# Patient Record
Sex: MALE | Race: WHITE | Employment: OTHER | URBAN - METROPOLITAN AREA
[De-identification: names, ages, dates, MRNs, and addresses within clinical notes are randomized per-mention and may not be internally consistent; named-entity substitution may affect disease eponyms.]

---

## 2019-02-26 ENCOUNTER — OFFICE VISIT (OUTPATIENT)
Dept: SLEEP MEDICINE | Age: 59
End: 2019-02-26

## 2019-02-26 VITALS
DIASTOLIC BLOOD PRESSURE: 91 MMHG | HEIGHT: 70 IN | HEART RATE: 76 BPM | SYSTOLIC BLOOD PRESSURE: 151 MMHG | BODY MASS INDEX: 36.94 KG/M2 | OXYGEN SATURATION: 92 % | WEIGHT: 258 LBS

## 2019-02-26 DIAGNOSIS — G47.33 OSA (OBSTRUCTIVE SLEEP APNEA): Primary | ICD-10-CM

## 2019-02-26 PROBLEM — E66.01 SEVERE OBESITY (HCC): Status: ACTIVE | Noted: 2019-02-26

## 2019-02-26 RX ORDER — DOXYCYCLINE 100 MG/1
CAPSULE ORAL
COMMUNITY
Start: 2019-02-18 | End: 2019-04-24

## 2019-02-26 RX ORDER — PRAVASTATIN SODIUM 40 MG/1
TABLET ORAL
Refills: 0 | COMMUNITY
Start: 2018-12-07

## 2019-02-26 RX ORDER — LISINOPRIL 20 MG/1
TABLET ORAL
Refills: 0 | COMMUNITY
Start: 2019-01-08

## 2019-02-26 RX ORDER — BENZONATATE 100 MG/1
CAPSULE ORAL
COMMUNITY
Start: 2019-02-18

## 2019-02-26 NOTE — PROGRESS NOTES
[address] [place], [address]  Tel.  [phone]  Fax. 100 [facility] 60  [place], [address] [employer]  Tel.  [phone]  Fax. [phone] [address] [facility] [place]  Tel.  [phone]  Fax. [phone]       Chief Complaint       Chief Complaint   Patient presents with    Sleep Problem     NP, self refd, snoring, witnessed apnea       HPI      Ann Jeans is a  66 y.o. male seen for evaluation of a sleep disorder  . The patient reports he has experienced daytime sleepiness, snoring, witnessed apnea. The patient retires at 10 pm and awakens at 5-5: 30 am. The patient notes that he will not experience frequent awakening from sleep. In general he is able to return to sleep after awakening. he tends to awaken spontaneously. His wife is told him of snoring described as loud; often associated with apparent apneas. Snoring may be exacerbated if he has alcoholic beverages with dinner. He does not report frequent awakening. He may easily doze if he is seated and an active in the evening such as watching TV. He denies sleepwalking or sleep talking, nightmares or vivid dreaming, hypnagogic hallucinations or sleep paralysis, bruxism, nocturnal incontinence, abnormal arm or leg movements, cognitive difficulties, headache on awakening or cataplexy    The patient has not undergone diagnostic testing for the current problems. [place] Sleepiness Score: 17       Not on File    Current Outpatient Medications   Medication Sig Dispense Refill    benzonatate (TESSALON) 100 mg capsule       doxycycline (VIBRAMYCIN) 100 mg capsule       lisinopril (PRINIVIL, ZESTRIL) 20 mg tablet take 1 tablet by mouth once daily  0    pravastatin (PRAVACHOL) 40 mg tablet take 1 tablet by mouth once daily  0        He  has no past medical history on file. He  has no past surgical history on file. He family history is not on file.     He  reports that  has never smoked. he has never used smokeless tobacco. He reports that he drinks alcohol. He reports that he does not use drugs. Review of Systems:  Review of Systems   Constitutional: Negative for chills and fever. HENT: Negative for hearing loss and tinnitus. Eyes: Negative for blurred vision and double vision. Respiratory: Positive for shortness of breath. Negative for cough. Cardiovascular: Negative for chest pain and palpitations. Gastrointestinal: Negative for abdominal pain and heartburn. Genitourinary: Negative for frequency and urgency. Musculoskeletal: Negative for back pain and neck pain. Skin: Negative for itching and rash. Neurological: Negative for dizziness and headaches. Psychiatric/Behavioral: Negative for depression. Objective:     Visit Vitals  BP (!) 151/91 (BP 1 Location: Right arm, BP Patient Position: Sitting)   Pulse 76   Ht 5' 10\" (1.778 m)   Wt 258 lb (117 kg)   SpO2 92%   BMI 37.02 kg/m²     Body mass index is 37.02 kg/m². General:   Conversant, cooperative   Eyes:  Pupils equal and reactive, no nystagmus   Oropharynx:   Mallampati score IV,  tongue scalloped   Tonsils:     Neck:   No carotid bruits; Neck circ. in \"inches\": 21   Chest/Lungs:  Clear on auscultation    CVS:  Normal rate, regular rhythm, 1+ distal edema   Skin:  Warm to touch; no obvious rashes   Neuro:  Speech fluent, face symmetrical, tongue movement normal   Psych:  Normal affect,  normal countenance        Assessment:       ICD-10-CM ICD-9-CM    1. GADIEL (obstructive sleep apnea) G47.33 327.23      History of snoring, witnessed apnea and daytime fatigue consistent with sleep disordered breathing. He will be evaluated with a home sleep test.  The results will be reviewed with him. Plan:     No orders of the defined types were placed in this encounter. * Patient has a history and examination consistent with the diagnosis of sleep apnea.   *Home sleep testing was ordered for initial evaluation. * He was provided information on sleep apnea including corresponding risk factors and the importance of proper treatment. * Treatment options if indicated were reviewed today. Instructions:  o The patient would benefit from weight reduction measures. o Do not engage in activities requiring a normal degree of alertness if fatigue is present. o The patient understands that untreated or undertreated sleep apnea could impair judgement and the ability to function normally during the day.  o Call or return if symptoms worsen or persist.          Paco Araya MD, FAASM  Electronically signed 02/26/19       This note was created using voice recognition software. Despite editing, there may be syntax errors. This note will not be viewable in [address]

## 2019-02-26 NOTE — PATIENT INSTRUCTIONS

## 2019-02-26 NOTE — PROGRESS NOTES
[facility] Setup _ [address]      · Patient was educated on proper hookup and operation of the HSAT. · Instruction forms and documentation were reviewed and signed. · The patient demonstrated good understanding of the HSAT. · General information regarding operations and maintenance of the device was provided. · Patient was provided information on sleep apnea including coresponding risk factors and the importance of proper treatment. · Follow-up appointment was made to return the HSAT. He will be contacted once the results have been reviewed. · Patient was asked to contact our office for any problems regarding his home sleep test study.

## 2019-02-27 ENCOUNTER — DOCUMENTATION ONLY (OUTPATIENT)
Dept: SLEEP MEDICINE | Age: 59
End: 2019-02-27

## 2019-03-01 ENCOUNTER — DOCUMENTATION ONLY (OUTPATIENT)
Dept: SLEEP MEDICINE | Age: 59
End: 2019-03-01

## 2019-04-23 ENCOUNTER — HOSPITAL ENCOUNTER (EMERGENCY)
Age: 59
Discharge: HOME | End: 2019-04-24
Attending: EMERGENCY MEDICINE
Payer: COMMERCIAL

## 2019-04-23 ENCOUNTER — APPOINTMENT (OUTPATIENT)
Dept: ULTRASOUND IMAGING | Age: 59
End: 2019-04-23
Attending: EMERGENCY MEDICINE
Payer: COMMERCIAL

## 2019-04-23 DIAGNOSIS — I82.4Y1 DVT, LOWER EXTREMITY, PROXIMAL, ACUTE, RIGHT (HCC): Primary | ICD-10-CM

## 2019-04-23 LAB
ALBUMIN SERPL-MCNC: 3.5 G/DL (ref 3.5–5)
ALBUMIN/GLOB SERPL: 0.8 {RATIO} (ref 1.1–2.2)
ALP SERPL-CCNC: 84 U/L (ref 45–117)
ALT SERPL-CCNC: 18 U/L (ref 12–78)
ANION GAP SERPL CALC-SCNC: 6 MMOL/L (ref 5–15)
AST SERPL-CCNC: 12 U/L (ref 15–37)
BASOPHILS # BLD: 0 K/UL (ref 0–0.1)
BASOPHILS NFR BLD: 0 % (ref 0–1)
BILIRUB SERPL-MCNC: 0.7 MG/DL (ref 0.2–1)
BUN SERPL-MCNC: 9 MG/DL (ref 6–20)
BUN/CREAT SERPL: 10 (ref 12–20)
CALCIUM SERPL-MCNC: 8.8 MG/DL (ref 8.5–10.1)
CHLORIDE SERPL-SCNC: 102 MMOL/L (ref 97–108)
CO2 SERPL-SCNC: 26 MMOL/L (ref 21–32)
CREAT SERPL-MCNC: 0.88 MG/DL (ref 0.7–1.3)
DIFFERENTIAL METHOD BLD: ABNORMAL
EOSINOPHIL # BLD: 0.1 K/UL (ref 0–0.4)
EOSINOPHIL NFR BLD: 1 % (ref 0–7)
ERYTHROCYTE [DISTWIDTH] IN BLOOD BY AUTOMATED COUNT: 13.2 % (ref 11.5–14.5)
GLOBULIN SER CALC-MCNC: 4.2 G/DL (ref 2–4)
GLUCOSE SERPL-MCNC: 140 MG/DL (ref 65–100)
HCT VFR BLD AUTO: 40.8 % (ref 36.6–50.3)
HGB BLD-MCNC: 13.3 G/DL (ref 12.1–17)
IMM GRANULOCYTES # BLD AUTO: 0.1 K/UL (ref 0–0.04)
IMM GRANULOCYTES NFR BLD AUTO: 0 % (ref 0–0.5)
LYMPHOCYTES # BLD: 2.1 K/UL (ref 0.8–3.5)
LYMPHOCYTES NFR BLD: 17 % (ref 12–49)
MCH RBC QN AUTO: 31.4 PG (ref 26–34)
MCHC RBC AUTO-ENTMCNC: 32.6 G/DL (ref 30–36.5)
MCV RBC AUTO: 96.2 FL (ref 80–99)
MONOCYTES # BLD: 1.5 K/UL (ref 0–1)
MONOCYTES NFR BLD: 12 % (ref 5–13)
NEUTS SEG # BLD: 8.7 K/UL (ref 1.8–8)
NEUTS SEG NFR BLD: 70 % (ref 32–75)
NRBC # BLD: 0 K/UL (ref 0–0.01)
NRBC BLD-RTO: 0 PER 100 WBC
PLATELET # BLD AUTO: 183 K/UL (ref 150–400)
PMV BLD AUTO: 9.5 FL (ref 8.9–12.9)
POTASSIUM SERPL-SCNC: 3.9 MMOL/L (ref 3.5–5.1)
PROT SERPL-MCNC: 7.7 G/DL (ref 6.4–8.2)
RBC # BLD AUTO: 4.24 M/UL (ref 4.1–5.7)
SODIUM SERPL-SCNC: 134 MMOL/L (ref 136–145)
WBC # BLD AUTO: 12.4 K/UL (ref 4.1–11.1)

## 2019-04-23 PROCEDURE — 99283 EMERGENCY DEPT VISIT LOW MDM: CPT

## 2019-04-23 PROCEDURE — 36415 COLL VENOUS BLD VENIPUNCTURE: CPT

## 2019-04-23 PROCEDURE — 87040 BLOOD CULTURE FOR BACTERIA: CPT

## 2019-04-23 PROCEDURE — 93971 EXTREMITY STUDY: CPT

## 2019-04-23 PROCEDURE — 80053 COMPREHEN METABOLIC PANEL: CPT

## 2019-04-23 PROCEDURE — 85025 COMPLETE CBC W/AUTO DIFF WBC: CPT

## 2019-04-23 RX ORDER — SODIUM CHLORIDE 0.9 % (FLUSH) 0.9 %
5-40 SYRINGE (ML) INJECTION AS NEEDED
Status: DISCONTINUED | OUTPATIENT
Start: 2019-04-23 | End: 2019-04-24 | Stop reason: HOSPADM

## 2019-04-23 RX ORDER — SODIUM CHLORIDE 0.9 % (FLUSH) 0.9 %
5-40 SYRINGE (ML) INJECTION EVERY 8 HOURS
Status: DISCONTINUED | OUTPATIENT
Start: 2019-04-23 | End: 2019-04-24 | Stop reason: HOSPADM

## 2019-04-24 VITALS
WEIGHT: 253.97 LBS | DIASTOLIC BLOOD PRESSURE: 74 MMHG | HEART RATE: 89 BPM | SYSTOLIC BLOOD PRESSURE: 130 MMHG | BODY MASS INDEX: 36.36 KG/M2 | TEMPERATURE: 98.7 F | HEIGHT: 70 IN | RESPIRATION RATE: 18 BRPM | OXYGEN SATURATION: 97 %

## 2019-04-24 PROCEDURE — 74011250637 HC RX REV CODE- 250/637: Performed by: EMERGENCY MEDICINE

## 2019-04-24 RX ORDER — DOXYCYCLINE HYCLATE 100 MG
100 TABLET ORAL 2 TIMES DAILY
Qty: 14 TAB | Refills: 0 | Status: SHIPPED | OUTPATIENT
Start: 2019-04-24 | End: 2019-05-01

## 2019-04-24 RX ADMIN — APIXABAN 10 MG: 5 TABLET, FILM COATED ORAL at 00:02

## 2019-04-24 NOTE — ED NOTES
Dr. Tristen Emery reviewed discharge instructions with the patient and spouse. The patient and spouse verbalized understanding.

## 2019-04-24 NOTE — DISCHARGE INSTRUCTIONS
Patient Education        Deep Vein Thrombosis: Care Instructions  Your Care Instructions    A deep vein thrombosis (DVT) is a blood clot in certain veins of the legs, pelvis, or arms. Blood clots in these veins need to be treated because they can get bigger, break loose, and travel through the bloodstream to the lungs. A blood clot in a lung can be life-threatening. The doctor may have given you a blood thinner (anticoagulant). A blood thinner can stop the blood clot from growing larger and prevent new clots from forming. You will need to take a blood thinner for 3 to 6 months or longer. The doctor has checked you carefully, but problems can develop later. If you notice any problems or new symptoms, get medical treatment right away. Follow-up care is a key part of your treatment and safety. Be sure to make and go to all appointments, and call your doctor if you are having problems. It's also a good idea to know your test results and keep a list of the medicines you take. How can you care for yourself at home? · Take your medicines exactly as prescribed. Call your doctor if you think you are having a problem with your medicine. · If you are taking a blood thinner, be sure you get instructions about how to take your medicine safely. Blood thinners can cause serious bleeding problems. · Wear compression stockings if your doctor recommends them. These stockings are tighter at the feet than on the legs. They may reduce pain and swelling in your legs. But there are different types of stockings, and they need to fit right. So your doctor will recommend what you need. · When you sit, use a pillow to raise the arm or leg that has the blood clot. Try to keep it above the level of your heart. When should you call for help? Call 911 anytime you think you may need emergency care.  For example, call if:    · You passed out (lost consciousness).     · You have symptoms of a blood clot in your lung (called a pulmonary embolism). These include:  ? Sudden chest pain. ? Trouble breathing. ? Coughing up blood.    Call your doctor now or seek immediate medical care if:    · You have new or worse trouble breathing.     · You are dizzy or lightheaded, or you feel like you may faint.     · You have symptoms of a blood clot in your arm or leg. These may include:  ? Pain in the arm, calf, back of the knee, thigh, or groin. ? Redness and swelling in the arm, leg, or groin.    Watch closely for changes in your health, and be sure to contact your doctor if:    · You do not get better as expected. Where can you learn more? Go to http://thai-elisha.info/. Enter I433 in the search box to learn more about \"Deep Vein Thrombosis: Care Instructions. \"  Current as of: September 26, 2018  Content Version: 11.9  © 0282-2298 [employer]. Care instructions adapted under license by [employer] (which disclaims liability or warranty for this information). If you have questions about a medical condition or this instruction, always ask your healthcare professional. [place] [address] any warranty or liability for your use of this information.

## 2019-04-24 NOTE — ED PROVIDER NOTES
EMERGENCY DEPARTMENT HISTORY AND PHYSICAL EXAM 
 
 
Date: 4/23/2019 Patient Name: Kizzy Acuña History of Presenting Illness Chief Complaint Patient presents with  Fever  
  patient reports he twisted his knee over the weekend. had fluid drawn off right knee at [address] earlier today and has since had chills and fever of 100.6 at home  Knee Injury History Provided By: Patient HPI: Kizzy Acuña, 66 y.o. male with PMHx significant for previous right knee meniscus tear presents to the ED with right knee pain and fever. Patient states he was dancing at a wedding on Saturday night when he twisted his knee. Knee became very painful and swollen over the last 2 days. Pain was severe and sharp and located over the medial aspect of his knee. He presented to [facility] earlier this afternoon and had a large amount of fluid drained from his knee. His pain improved dramatically following the arthrocentesis. When he arrived back at home he was having chills and his wife took his temperature noting it to be 100.6. They called the [facility] on-call doctor who recommended them come to the ED for evaluation for possible septic joint. Patient has been able to ambulate on his knee and bend it especially after the arthrocentesis this afternoon he says it is feeling much better. He also reports in the past several days he has been less mobile than usual because of his knee pain he has been sitting in his recliner chair with his feet hanging down. He is noticed over the past 24 hours that his right leg has become more swollen in the calf area as well and his wife reports that the skin of the calf looks slightly pink compared to normal.   
There are no other complaints, changes, or physical findings at this time. PCP: Mike Rousseau MD 
 
No current facility-administered medications on file prior to encounter. Current Outpatient Medications on File Prior to Encounter Medication Sig Dispense Refill  benzonatate (TESSALON) 100 mg capsule  doxycycline (VIBRAMYCIN) 100 mg capsule  lisinopril (PRINIVIL, ZESTRIL) 20 mg tablet take 1 tablet by mouth once daily  0  
 pravastatin (PRAVACHOL) 40 mg tablet take 1 tablet by mouth once daily  0 Past History Past Medical History: No past medical history on file. Past Surgical History: No past surgical history on file. Family History: No family history on file. Social History: 
Social History Tobacco Use  Smoking status: Never Smoker  Smokeless tobacco: Never Used Substance Use Topics  Alcohol use: Yes Comment: oacass  Drug use: No  
 
 
Allergies: 
No Known Allergies Review of Systems Review of Systems Constitutional: Positive for chills and fever. HENT: Negative for congestion, ear pain and sore throat. Eyes: Negative. Respiratory: Negative for cough, chest tightness and shortness of breath. Cardiovascular: Positive for leg swelling. Negative for chest pain and palpitations. Gastrointestinal: Negative for abdominal pain, constipation, nausea and vomiting. Genitourinary: Negative for dysuria, flank pain and hematuria. Musculoskeletal: Positive for arthralgias (r knee), joint swelling and myalgias. Negative for neck pain. Skin: Positive for color change (redness right calf). Negative for rash. Wound: small abrasion anterior right lower leg. Neurological: Negative for dizziness, syncope, weakness, numbness and headaches. Psychiatric/Behavioral: Negative for confusion. The patient is not nervous/anxious. Physical Exam  
General appearance - well nourished, well appearing, and in no distress Eyes - pupils equal and reactive, extraocular eye movements intact ENT - mucous membranes moist, pharynx normal without lesions Neck - supple, no significant adenopathy; non-tender to palpation Chest - clear to auscultation, no wheezes, rales or rhonchi; non-tender to palpation Heart - normal rate and regular rhythm, S1 and S2 normal, no murmurs noted Abdomen - soft, nontender, nondistended, no masses or organomegaly Musculoskeletal -right knee with mild medial joint line tenderness, slightly decreased range of motion but able to bend to at least 90 degrees, no deformity, mild swelling of right knee deformityExtremities - peripheral pulses normal, right lower leg with 1+ pitting edema, and calf tenderness, Skin - supriya turgor, no rashes, slight erythema right lower leg Neurological - alert, oriented x3, normal speech, no focal findings or movement disorder noted Diagnostic Study Results Labs - No results found for this or any previous visit (from the past 12 hour(s)). Radiologic Studies - No orders to display CT Results  (Last 48 hours) None CXR Results  (Last 48 hours) None Medical Decision Making I am the first provider for this patient. I reviewed the vital signs, available nursing notes, past medical history, past surgical history, family history and social history. Vital Signs-Reviewed the patient's vital signs. Patient Vitals for the past 12 hrs: 
 Temp Pulse Resp BP SpO2  
04/23/19 2102 98.7 °F (37.1 °C) 93 16 144/73 96 % Records Reviewed: Nursing Notes and Old Medical Records Provider Notes (Medical Decision Making):  
43-year-old male presents with right knee pain status post an injury 3 days ago. Now with fever to 100.6 at home following arthrocentesis at [facility] on-call earlier today. Differential diagnosis includes cellulitis, DVT, septic joint, viral syndrome. Will check blood work, Doppler of right lower leg. Arthrocentesis fluid has been sent to lab by [facility] on-call so will not repeat procedure here. ED Course:  
Initial assessment performed.  The patients presenting problems have been discussed, and they are in agreement with the care plan formulated and outlined with them. I have encouraged them to ask questions as they arise throughout their visit. Progress Notes: 
 Right lower extremity Doppler positive for DVT that extends from the femoral vein down to the posterior tibial vein. Will prescribe Eliquis. Patient instructed to return immediately for any chest pains or shortness of breath. Disposition: 
NM home PLAN: 
1. Discharge Medication List as of 4/24/2019  1:10 AM  
  
START taking these medications Details  
apixaban (ELIQUIS) 5 mg (74 tabs) starter pack Take 10 mg (two 5 mg tablets) by mouth twice a day for 7 days Followed by 5 mg (one 5 mg tablet) by mouth twice a day, Print, Disp-1 Dose Pack, R-0  
  
  
CONTINUE these medications which have NOT CHANGED Details  
benzonatate (TESSALON) 100 mg capsule Historical Med  
  
lisinopril (PRINIVIL, ZESTRIL) 20 mg tablet take 1 tablet by mouth once daily, Historical Med, R-0  
  
pravastatin (PRAVACHOL) 40 mg tablet take 1 tablet by mouth once daily, Historical Med, R-0  
  
doxycycline (VIBRAMYCIN) 100 mg capsule Historical Med 2. Follow-up Information Follow up With Specialties Details Why Contact Info Lo Nunez MD [facility] Call today  [address] [place] [address] 
[phone] Ricardo Reyes MD Orthopedic Surgery Call today  [address] [place] [address] 
[phone] Return to ED if worse Diagnosis Clinical Impression: 1. DVT, lower extremity, proximal, acute, right ([address]

## 2019-04-28 LAB
BACTERIA SPEC CULT: NORMAL
SERVICE CMNT-IMP: NORMAL

## 2019-05-28 ENCOUNTER — OFFICE VISIT (OUTPATIENT)
Dept: SLEEP MEDICINE | Age: 59
End: 2019-05-28

## 2019-05-28 VITALS
HEIGHT: 70 IN | DIASTOLIC BLOOD PRESSURE: 76 MMHG | WEIGHT: 245 LBS | SYSTOLIC BLOOD PRESSURE: 145 MMHG | OXYGEN SATURATION: 98 % | RESPIRATION RATE: 19 BRPM | BODY MASS INDEX: 35.07 KG/M2 | HEART RATE: 86 BPM

## 2019-05-28 DIAGNOSIS — G47.33 OSA (OBSTRUCTIVE SLEEP APNEA): Primary | ICD-10-CM

## 2019-05-28 NOTE — PROGRESS NOTES
[address] [place], [address]  Tel.  [phone]  Fax. 100 [facility] 60  [place], [address] [facility]  Tel.  [phone]  Fax. [phone] [address] [facility] [place]   Tel.  [phone]  Fax. [phone]         Chief Complaint       Chief Complaint   Patient presents with    Sleep Problem     Adherence visit- bring machine         BUDDY        Snehal Thompson is a 66 y.o. male seen for follow-up. He reported daytime sleepiness, snoring, witnessed apnea. He was evaluated with a home sleep study which demonstrated severe sleep disordered breathing characterized by an AHI of 48.3/h associated with minimal SaO2 of 63%. Snoring during 69.3% of the study.       Events consisting of hypopnea and obstructive apnea. The pattern of the obstructive apnea consistent with REM-related events.      APAP 8-16 cm was started. Compliance data downloaded and reviewed in detail with the patient today. During the past 30 days, APAP used during 24 days with the average daily use of 5.8 hours. CMS compliance criteria 67%. AHI 2.1 per hour. From 3/19-4/17 APAP use during 30/30 days with the average daily use of 6.8 hours. CMS compliance criteria 100%. Average AHI 1.2/h. Peak average pressure 12.7 cm. Episodes of elevated leak. He notes he is more energetic and is not experiencing significant fatigue. No Known Allergies    Current Outpatient Medications   Medication Sig Dispense Refill    apixaban (ELIQUIS) 5 mg (74 tabs) starter pack Take 10 mg (two 5 mg tablets) by mouth twice a day for 7 days   Followed by 5 mg (one 5 mg tablet) by mouth twice a day 1 Dose Pack 0    benzonatate (TESSALON) 100 mg capsule       lisinopril (PRINIVIL, ZESTRIL) 20 mg tablet take 1 tablet by mouth once daily  0    pravastatin (PRAVACHOL) 40 mg tablet take 1 tablet by mouth once daily  0        He  has no past medical history on file.     He  has no past surgical history on file. He family history is not on file. He  reports that he has never smoked. He has never used smokeless tobacco. He reports that he drinks alcohol. He reports that he does not use drugs. Review of Systems:  Unchanged per patient      Objective:     Visit Vitals  /76 (BP 1 Location: Left arm, BP Patient Position: Sitting)   Pulse 86   Resp 19   Ht 5' 10\" (1.778 m)   Wt 245 lb (111.1 kg)   SpO2 98%   BMI 35.15 kg/m²     Body mass index is 35.15 kg/m². Assessment:       ICD-10-CM ICD-9-CM    1. GADIEL (obstructive sleep apnea) G47.33 327.23    Severe sleep disordered breathing responding well to APAP. He does have some episodes of mask leak. He would benefit from a mask refitting before he receives new supplies. He will contact the office as needed. he is compliant with PAP therapy and PAP continues to benefit patient and remains necessary for control of his sleep apnea. Plan:   No orders of the defined types were placed in this encounter. * Patient has a history and examination consistent with the diagnosis of sleep apnea. * He was provided information on sleep apnea including corresponding risk factors and the importance of proper treatment. * Treatment options if indicated were reviewed today. Potential benefit of weight reduction was discussed. Weight reduction techniques reviewed. Isael Valentine MD, [facility]  Electronically signed 05/28/19        This note was created using voice recognition software. Despite editing, there may be syntax errors. This note will not be viewable in [address]

## 2019-05-28 NOTE — PATIENT INSTRUCTIONS

## 2019-07-25 ENCOUNTER — HOSPITAL ENCOUNTER (OUTPATIENT)
Dept: ULTRASOUND IMAGING | Age: 59
Discharge: HOME | End: 2019-07-25
Attending: FAMILY MEDICINE
Payer: COMMERCIAL

## 2019-07-25 DIAGNOSIS — M79.661 PAIN OF RIGHT LOWER LEG: ICD-10-CM

## 2019-07-25 PROCEDURE — 93971 EXTREMITY STUDY: CPT

## 2022-03-19 PROBLEM — E66.01 SEVERE OBESITY (HCC): Status: ACTIVE | Noted: 2019-02-26

## 2024-01-30 PROBLEM — H04.123 DRY EYES, BILATERAL: Status: ACTIVE | Noted: 2024-01-30

## 2024-04-03 ENCOUNTER — TELEPHONE (OUTPATIENT)
Age: 64
End: 2024-04-03

## 2024-04-03 NOTE — TELEPHONE ENCOUNTER
Tried to call patient to obtain more info for his appt. Both numbers listed are non working numbers

## 2024-04-04 ENCOUNTER — OFFICE VISIT (OUTPATIENT)
Age: 64
End: 2024-04-04
Payer: COMMERCIAL

## 2024-04-04 ENCOUNTER — PREP FOR PROCEDURE (OUTPATIENT)
Age: 64
End: 2024-04-04

## 2024-04-04 VITALS
TEMPERATURE: 97.5 F | HEIGHT: 70 IN | BODY MASS INDEX: 31.35 KG/M2 | OXYGEN SATURATION: 97 % | DIASTOLIC BLOOD PRESSURE: 79 MMHG | SYSTOLIC BLOOD PRESSURE: 126 MMHG | HEART RATE: 71 BPM | WEIGHT: 219 LBS | RESPIRATION RATE: 20 BRPM

## 2024-04-04 DIAGNOSIS — K43.9 ABDOMINAL WALL HERNIA: ICD-10-CM

## 2024-04-04 DIAGNOSIS — E66.9 OBESITY (BMI 30.0-34.9): ICD-10-CM

## 2024-04-04 DIAGNOSIS — K43.9 ABDOMINAL WALL HERNIA: Primary | ICD-10-CM

## 2024-04-04 PROCEDURE — 99205 OFFICE O/P NEW HI 60 MIN: CPT | Performed by: SURGERY

## 2024-04-04 ASSESSMENT — PATIENT HEALTH QUESTIONNAIRE - PHQ9
SUM OF ALL RESPONSES TO PHQ QUESTIONS 1-9: 0
SUM OF ALL RESPONSES TO PHQ9 QUESTIONS 1 & 2: 0
SUM OF ALL RESPONSES TO PHQ QUESTIONS 1-9: 0
SUM OF ALL RESPONSES TO PHQ QUESTIONS 1-9: 0
1. LITTLE INTEREST OR PLEASURE IN DOING THINGS: NOT AT ALL
SUM OF ALL RESPONSES TO PHQ QUESTIONS 1-9: 0
2. FEELING DOWN, DEPRESSED OR HOPELESS: NOT AT ALL

## 2024-04-04 ASSESSMENT — ENCOUNTER SYMPTOMS
SHORTNESS OF BREATH: 0
BLOOD IN STOOL: 0

## 2024-04-04 NOTE — H&P (VIEW-ONLY)
Srinath Howe (:  1960) is a 63 y.o. male, here for evaluation of the following chief complaint(s):  New Patient (Seen at the request of Dr. Daquan Canales for evaluation of possible umbilical hernia)         ASSESSMENT/PLAN:  1. Abdominal wall hernia  2. Obesity (BMI 30.0-34.9)      I had an extensive discussion with him regarding the risks, benefits, and alternatives of proceeding with a(n) Laparoscopic anterior abdominal hernia Repair with Mesh, with separation of components, robot assisted.  Risks,benefits, and alternatives were discussed including the risk of anesthesia, bleeding, infection, injury to bowel, chronic pain, and recurrence were discussed.    We discussed his risk factors including: obese, advancement flaps. These related to perioperative morbidity including the increased risk of wound infection, and wound breakdown, and hernia reoccurrence  requiring intervention.      Due to the associated diastases he is high risk of recurrence if we do not reconstruct his abdominal wall at the time of hernia repair.    He expressed understanding of our discussion and is agreeable with plan.    Thank you for this consult.          Subjective   HPI:  HPI    Hernia   Getting bigger     Appetite ok BMs ok      ____________________________________________________________________________  Chief Complaint   Patient presents with   • New Patient     Seen at the request of Dr. Daquan Canales for evaluation of possible umbilical hernia     /79 (Site: Left Upper Arm, Position: Sitting, Cuff Size: Large Adult)   Pulse 71   Temp 97.5 °F (36.4 °C) (Temporal)   Resp 20   Ht 1.778 m (5' 10\")   Wt 99.3 kg (219 lb)   SpO2 97%   BMI 31.42 kg/m²   Past Medical History:   Diagnosis Date   • Hypercholesteremia    • Hypertension      Past Surgical History:   Procedure Laterality Date   • TONSILLECTOMY     • WISDOM TOOTH EXTRACTION       Social History     Socioeconomic History   • Marital status:

## 2024-04-04 NOTE — PROGRESS NOTES
Identified pt with two pt identifiers (name and ). Reviewed chart in preparation for visit and have obtained necessary documentation.    Srinath Howe is a 63 y.o. male  Chief Complaint   Patient presents with    New Patient     Seen at the request of Dr. Daquan Canales for evaluation of possible umbilical hernia     /79 (Site: Left Upper Arm, Position: Sitting, Cuff Size: Large Adult)   Pulse 71   Temp 97.5 °F (36.4 °C) (Temporal)   Resp 20   Ht 1.778 m (5' 10\")   Wt 99.3 kg (219 lb)   SpO2 97%   BMI 31.42 kg/m²     1. Have you been to the ER, urgent care clinic since your last visit?  Hospitalized since your last visit?no    2. Have you seen or consulted any other health care providers outside of the [facility] since your last visit?  Include any pap smears or colon screening. no   
     Trachea: No tracheal deviation.   Cardiovascular:      Rate and Rhythm: Normal rate and regular rhythm.      Heart sounds: Normal heart sounds. No murmur heard.  Pulmonary:      Effort: Pulmonary effort is normal. No respiratory distress.      Breath sounds: No wheezing.   Abdominal:      General: Bowel sounds are normal. There is no distension.      Palpations: Abdomen is soft. There is no mass.      Tenderness: There is abdominal tenderness (mild). There is no guarding or rebound.      Hernia: A hernia is present.       Musculoskeletal:         General: No tenderness. Normal range of motion.      Cervical back: Normal range of motion and neck supple.   Lymphadenopathy:      Cervical: No cervical adenopathy.   Skin:     General: Skin is warm.      Findings: No erythema or rash.   Neurological:      Mental Status: He is alert and oriented to person, place, and time.   Psychiatric:         Behavior: Behavior normal.        Name of Study: Rena MANDEL 20-01, Site 612  : 1960   Consent Provided By: Patient  Consent Version: Revision #5 Dated 2022, IRB Approved Dec 16, 2022  Date: 24  Time: 11 am      Consent was thoroughly reviewed with Srinath Howe on (date) 24, including study procedures, the approximate number of participants involved in the trial, length of participation, risks and benefits, other options for treatment, compensation for participation (if any), anticipated expenses, what happens in case of injury, cirumstances under which a subject's participation may be terminated, and participation is voluntary with the right to withdraw at any time without consequences.     We explained that records identifying the subject will be kept confidential and, to the extent permitted by the applicable laws and/or regulations, will not be made publicly available. If the results of the trial are published, the subject's identity will remain confidential. The monitor(s), the (s), the

## 2024-04-29 RX ORDER — DOXYCYCLINE HYCLATE 50 MG/1
50 CAPSULE ORAL AS NEEDED
COMMUNITY

## 2024-04-29 NOTE — PERIOP NOTE
[facility]  Preoperative Instructions        Surgery Date 5/1/24   Time of Arrival:  To Be Called  Contact # [phone]    1. On the day of your surgery, please report to the Surgical Services Registration Desk and sign in at your designated time. The Surgery Center is located to the right of the Emergency Room.     2. You must have someone with you to drive you home. You should not drive a car for 24 hours following surgery. Please make arrangements for a friend or family member to stay with you for the first 24 hours after your surgery.    3. Do not have anything to eat or drink (including water, gum, mints, coffee, juice) after midnight ?4/30/24?      .?This may not apply to medications prescribed by your physician. ?(Please note below the special instructions with medications to take the morning of your procedure.)    4. We recommend you do not drink any alcoholic beverages for 24 hours before and after your surgery.    5. Contact your surgeon’s office for instructions on the following medications: non-steroidal anti-inflammatory drugs (i.e. Advil, Aleve), vitamins, and supplements. (Some surgeon’s will want you to stop these medications prior to surgery and others may allow you to take them)  **If you are currently taking Plavix, Coumadin, Aspirin and/or other blood-thinning agents, contact your surgeon for instructions.** Your surgeon will partner with the physician prescribing these medications to determine if it is safe to stop or if you need to continue taking.  Please do not stop taking these medications without instructions from your surgeon    6. Wear comfortable clothes.  Wear glasses instead of contacts.  Do not bring any money or jewelry. Please bring picture ID, insurance card, and any prearranged co-payment or hospital payment.  Do not wear make-up, particularly mascara the morning of your surgery.  Do not wear nail polish, particularly if you are having foot /hand

## 2024-04-30 ENCOUNTER — ANESTHESIA EVENT (OUTPATIENT)
Facility: HOSPITAL | Age: 64
End: 2024-04-30
Payer: COMMERCIAL

## 2024-04-30 NOTE — ANESTHESIA PRE PROCEDURE
Cardiovascular:  Exercise tolerance: good (>4 METS)  (+) hypertension:, hyperlipidemia        Rhythm: regular  Rate: normal                    Neuro/Psych:   Negative Neuro/Psych ROS              GI/Hepatic/Renal: Neg GI/Hepatic/Renal ROS            Endo/Other:    (+) : arthritis: OA..                 Abdominal:             Vascular:           ROS comment: Hx of blood clots. Other Findings:         Anesthesia Plan      general     ASA 2       Induction: intravenous.    MIPS: Prophylactic antiemetics administered.  Anesthetic plan and risks discussed with patient.      Plan discussed with CRNA.                  Isaac Stratton MD   4/30/2024

## 2024-05-01 ENCOUNTER — HOSPITAL ENCOUNTER (OUTPATIENT)
Facility: HOSPITAL | Age: 64
Setting detail: OUTPATIENT SURGERY
Discharge: HOME | End: 2024-05-01
Attending: SURGERY | Admitting: SURGERY
Payer: COMMERCIAL

## 2024-05-01 ENCOUNTER — ANESTHESIA (OUTPATIENT)
Facility: HOSPITAL | Age: 64
End: 2024-05-01
Payer: COMMERCIAL

## 2024-05-01 VITALS
HEIGHT: 70 IN | BODY MASS INDEX: 30.55 KG/M2 | OXYGEN SATURATION: 96 % | DIASTOLIC BLOOD PRESSURE: 78 MMHG | HEART RATE: 68 BPM | WEIGHT: 213.41 LBS | SYSTOLIC BLOOD PRESSURE: 115 MMHG | RESPIRATION RATE: 14 BRPM | TEMPERATURE: 97.8 F

## 2024-05-01 DIAGNOSIS — R52 PAIN: Primary | ICD-10-CM

## 2024-05-01 PROCEDURE — 2709999900 HC NON-CHARGEABLE SUPPLY: Performed by: SURGERY

## 2024-05-01 PROCEDURE — 2580000003 HC RX 258

## 2024-05-01 PROCEDURE — 6370000000 HC RX 637 (ALT 250 FOR IP): Performed by: SURGERY

## 2024-05-01 PROCEDURE — 6360000002 HC RX W HCPCS: Performed by: SURGERY

## 2024-05-01 PROCEDURE — 7100000011 HC PHASE II RECOVERY - ADDTL 15 MIN: Performed by: SURGERY

## 2024-05-01 PROCEDURE — 2500000003 HC RX 250 WO HCPCS: Performed by: SURGERY

## 2024-05-01 PROCEDURE — C1781 MESH (IMPLANTABLE): HCPCS | Performed by: SURGERY

## 2024-05-01 PROCEDURE — 3600000019 HC SURGERY ROBOT ADDTL 15MIN: Performed by: SURGERY

## 2024-05-01 PROCEDURE — 2500000003 HC RX 250 WO HCPCS

## 2024-05-01 PROCEDURE — 88302 TISSUE EXAM BY PATHOLOGIST: CPT

## 2024-05-01 PROCEDURE — 2580000003 HC RX 258: Performed by: SURGERY

## 2024-05-01 PROCEDURE — 7100000010 HC PHASE II RECOVERY - FIRST 15 MIN: Performed by: SURGERY

## 2024-05-01 PROCEDURE — 6360000002 HC RX W HCPCS

## 2024-05-01 PROCEDURE — 49329 UNLSTD LAPS PX ABD PERTM&OMN: CPT | Performed by: SURGERY

## 2024-05-01 PROCEDURE — 3700000000 HC ANESTHESIA ATTENDED CARE: Performed by: SURGERY

## 2024-05-01 PROCEDURE — 3700000001 HC ADD 15 MINUTES (ANESTHESIA): Performed by: SURGERY

## 2024-05-01 PROCEDURE — S2900 ROBOTIC SURGICAL SYSTEM: HCPCS | Performed by: SURGERY

## 2024-05-01 PROCEDURE — 2580000003 HC RX 258: Performed by: ANESTHESIOLOGY

## 2024-05-01 PROCEDURE — 15734 MUSCLE-SKIN GRAFT TRUNK: CPT | Performed by: SURGERY

## 2024-05-01 PROCEDURE — 7100000001 HC PACU RECOVERY - ADDTL 15 MIN: Performed by: SURGERY

## 2024-05-01 PROCEDURE — 3600000009 HC SURGERY ROBOT BASE: Performed by: SURGERY

## 2024-05-01 PROCEDURE — 49596 RPR AA HRN 1ST > 10 NCR/STRN: CPT | Performed by: SURGERY

## 2024-05-01 PROCEDURE — 7100000000 HC PACU RECOVERY - FIRST 15 MIN: Performed by: SURGERY

## 2024-05-01 DEVICE — GORE SYNECOR PREPERITONEAL 20CMX25CMRECTANGLE BIOMATERIAL
Type: IMPLANTABLE DEVICE | Site: ABDOMEN | Status: FUNCTIONAL
Brand: GORE SYNECOR PREPERITONEAL BIOMATERIAL

## 2024-05-01 RX ORDER — SODIUM CHLORIDE, SODIUM LACTATE, POTASSIUM CHLORIDE, CALCIUM CHLORIDE 600; 310; 30; 20 MG/100ML; MG/100ML; MG/100ML; MG/100ML
INJECTION, SOLUTION INTRAVENOUS ONCE
Status: COMPLETED | OUTPATIENT
Start: 2024-05-01 | End: 2024-05-01

## 2024-05-01 RX ORDER — SODIUM CHLORIDE, SODIUM LACTATE, POTASSIUM CHLORIDE, CALCIUM CHLORIDE 600; 310; 30; 20 MG/100ML; MG/100ML; MG/100ML; MG/100ML
INJECTION, SOLUTION INTRAVENOUS CONTINUOUS
Status: DISCONTINUED | OUTPATIENT
Start: 2024-05-01 | End: 2024-05-01 | Stop reason: HOSPADM

## 2024-05-01 RX ORDER — DEXMEDETOMIDINE HYDROCHLORIDE 100 UG/ML
INJECTION, SOLUTION INTRAVENOUS PRN
Status: DISCONTINUED | OUTPATIENT
Start: 2024-05-01 | End: 2024-05-01 | Stop reason: SDUPTHER

## 2024-05-01 RX ORDER — SODIUM CHLORIDE 9 MG/ML
INJECTION, SOLUTION INTRAVENOUS PRN
Status: DISCONTINUED | OUTPATIENT
Start: 2024-05-01 | End: 2024-05-01 | Stop reason: HOSPADM

## 2024-05-01 RX ORDER — BETHANECHOL CHLORIDE 10 MG/1
10 TABLET ORAL
Status: DISCONTINUED | OUTPATIENT
Start: 2024-05-01 | End: 2024-05-01 | Stop reason: HOSPADM

## 2024-05-01 RX ORDER — SODIUM CHLORIDE, SODIUM LACTATE, POTASSIUM CHLORIDE, CALCIUM CHLORIDE 600; 310; 30; 20 MG/100ML; MG/100ML; MG/100ML; MG/100ML
INJECTION, SOLUTION INTRAVENOUS CONTINUOUS PRN
Status: DISCONTINUED | OUTPATIENT
Start: 2024-05-01 | End: 2024-05-01 | Stop reason: SDUPTHER

## 2024-05-01 RX ORDER — PROCHLORPERAZINE EDISYLATE 5 MG/ML
5 INJECTION INTRAMUSCULAR; INTRAVENOUS
Status: DISCONTINUED | OUTPATIENT
Start: 2024-05-01 | End: 2024-05-01 | Stop reason: HOSPADM

## 2024-05-01 RX ORDER — HYDROMORPHONE HYDROCHLORIDE 1 MG/ML
0.5 INJECTION, SOLUTION INTRAMUSCULAR; INTRAVENOUS; SUBCUTANEOUS EVERY 5 MIN PRN
Status: DISCONTINUED | OUTPATIENT
Start: 2024-05-01 | End: 2024-05-01 | Stop reason: HOSPADM

## 2024-05-01 RX ORDER — OXYCODONE HYDROCHLORIDE 5 MG/1
5 TABLET ORAL EVERY 6 HOURS PRN
Qty: 25 TABLET | Refills: 0 | Status: SHIPPED | OUTPATIENT
Start: 2024-05-01 | End: 2024-05-08

## 2024-05-01 RX ORDER — HYDROMORPHONE HYDROCHLORIDE 2 MG/ML
INJECTION, SOLUTION INTRAMUSCULAR; INTRAVENOUS; SUBCUTANEOUS PRN
Status: DISCONTINUED | OUTPATIENT
Start: 2024-05-01 | End: 2024-05-01 | Stop reason: SDUPTHER

## 2024-05-01 RX ORDER — SODIUM CHLORIDE 0.9 % (FLUSH) 0.9 %
5-40 SYRINGE (ML) INJECTION PRN
Status: DISCONTINUED | OUTPATIENT
Start: 2024-05-01 | End: 2024-05-01 | Stop reason: HOSPADM

## 2024-05-01 RX ORDER — ONDANSETRON 4 MG/1
4 TABLET, FILM COATED ORAL EVERY 8 HOURS PRN
Qty: 8 TABLET | Refills: 1 | Status: SHIPPED | OUTPATIENT
Start: 2024-05-01

## 2024-05-01 RX ORDER — POLYETHYLENE GLYCOL 3350 17 G/17G
17 POWDER, FOR SOLUTION ORAL DAILY
Qty: 116 G | Refills: 0 | COMMUNITY
Start: 2024-05-01 | End: 2024-05-08

## 2024-05-01 RX ORDER — OXYCODONE HYDROCHLORIDE 5 MG/1
10 TABLET ORAL ONCE
Status: COMPLETED | OUTPATIENT
Start: 2024-05-01 | End: 2024-05-01

## 2024-05-01 RX ORDER — ONDANSETRON 2 MG/ML
INJECTION INTRAMUSCULAR; INTRAVENOUS PRN
Status: DISCONTINUED | OUTPATIENT
Start: 2024-05-01 | End: 2024-05-01 | Stop reason: SDUPTHER

## 2024-05-01 RX ORDER — LIDOCAINE HYDROCHLORIDE 20 MG/ML
INJECTION, SOLUTION EPIDURAL; INFILTRATION; INTRACAUDAL; PERINEURAL PRN
Status: DISCONTINUED | OUTPATIENT
Start: 2024-05-01 | End: 2024-05-01 | Stop reason: SDUPTHER

## 2024-05-01 RX ORDER — SUCCINYLCHOLINE CHLORIDE 20 MG/ML
INJECTION INTRAMUSCULAR; INTRAVENOUS PRN
Status: DISCONTINUED | OUTPATIENT
Start: 2024-05-01 | End: 2024-05-01 | Stop reason: SDUPTHER

## 2024-05-01 RX ORDER — ROCURONIUM BROMIDE 10 MG/ML
INJECTION, SOLUTION INTRAVENOUS PRN
Status: DISCONTINUED | OUTPATIENT
Start: 2024-05-01 | End: 2024-05-01 | Stop reason: SDUPTHER

## 2024-05-01 RX ORDER — SODIUM CHLORIDE 0.9 % (FLUSH) 0.9 %
5-40 SYRINGE (ML) INJECTION EVERY 12 HOURS SCHEDULED
Status: DISCONTINUED | OUTPATIENT
Start: 2024-05-01 | End: 2024-05-01 | Stop reason: HOSPADM

## 2024-05-01 RX ORDER — KETOROLAC TROMETHAMINE 30 MG/ML
INJECTION, SOLUTION INTRAMUSCULAR; INTRAVENOUS PRN
Status: DISCONTINUED | OUTPATIENT
Start: 2024-05-01 | End: 2024-05-01 | Stop reason: SDUPTHER

## 2024-05-01 RX ORDER — ACETAMINOPHEN 325 MG/1
650 TABLET ORAL 4 TIMES DAILY PRN
COMMUNITY
Start: 2024-05-01

## 2024-05-01 RX ORDER — IBUPROFEN 600 MG/1
600 TABLET ORAL 3 TIMES DAILY PRN
Qty: 25 TABLET | Refills: 0 | Status: SHIPPED | OUTPATIENT
Start: 2024-05-01

## 2024-05-01 RX ORDER — NALOXONE HYDROCHLORIDE 0.4 MG/ML
INJECTION, SOLUTION INTRAMUSCULAR; INTRAVENOUS; SUBCUTANEOUS PRN
Status: DISCONTINUED | OUTPATIENT
Start: 2024-05-01 | End: 2024-05-01 | Stop reason: HOSPADM

## 2024-05-01 RX ORDER — DEXAMETHASONE SODIUM PHOSPHATE 4 MG/ML
INJECTION, SOLUTION INTRA-ARTICULAR; INTRALESIONAL; INTRAMUSCULAR; INTRAVENOUS; SOFT TISSUE PRN
Status: DISCONTINUED | OUTPATIENT
Start: 2024-05-01 | End: 2024-05-01 | Stop reason: SDUPTHER

## 2024-05-01 RX ORDER — OXYCODONE HYDROCHLORIDE 5 MG/1
5 TABLET ORAL
Status: DISCONTINUED | OUTPATIENT
Start: 2024-05-01 | End: 2024-05-01 | Stop reason: HOSPADM

## 2024-05-01 RX ORDER — ONDANSETRON 2 MG/ML
4 INJECTION INTRAMUSCULAR; INTRAVENOUS
Status: DISCONTINUED | OUTPATIENT
Start: 2024-05-01 | End: 2024-05-01 | Stop reason: HOSPADM

## 2024-05-01 RX ORDER — TAMSULOSIN HYDROCHLORIDE 0.4 MG/1
0.4 CAPSULE ORAL ONCE
Status: COMPLETED | OUTPATIENT
Start: 2024-05-01 | End: 2024-05-01

## 2024-05-01 RX ORDER — MIDAZOLAM HYDROCHLORIDE 1 MG/ML
INJECTION INTRAMUSCULAR; INTRAVENOUS PRN
Status: DISCONTINUED | OUTPATIENT
Start: 2024-05-01 | End: 2024-05-01 | Stop reason: SDUPTHER

## 2024-05-01 RX ORDER — FENTANYL CITRATE 50 UG/ML
INJECTION, SOLUTION INTRAMUSCULAR; INTRAVENOUS PRN
Status: DISCONTINUED | OUTPATIENT
Start: 2024-05-01 | End: 2024-05-01 | Stop reason: SDUPTHER

## 2024-05-01 RX ORDER — FENTANYL CITRATE 50 UG/ML
25 INJECTION, SOLUTION INTRAMUSCULAR; INTRAVENOUS EVERY 5 MIN PRN
Status: DISCONTINUED | OUTPATIENT
Start: 2024-05-01 | End: 2024-05-01 | Stop reason: HOSPADM

## 2024-05-01 RX ADMIN — ONDANSETRON 4 MG: 2 INJECTION INTRAMUSCULAR; INTRAVENOUS at 11:32

## 2024-05-01 RX ADMIN — SODIUM CHLORIDE, POTASSIUM CHLORIDE, SODIUM LACTATE AND CALCIUM CHLORIDE: 600; 310; 30; 20 INJECTION, SOLUTION INTRAVENOUS at 09:41

## 2024-05-01 RX ADMIN — SUCCINYLCHOLINE CHLORIDE 160 MG: 20 INJECTION, SOLUTION INTRAMUSCULAR; INTRAVENOUS at 09:44

## 2024-05-01 RX ADMIN — MIDAZOLAM HYDROCHLORIDE 2 MG: 1 INJECTION, SOLUTION INTRAMUSCULAR; INTRAVENOUS at 09:41

## 2024-05-01 RX ADMIN — HYDROMORPHONE HYDROCHLORIDE 0.3 MG: 2 INJECTION INTRAMUSCULAR; INTRAVENOUS; SUBCUTANEOUS at 10:36

## 2024-05-01 RX ADMIN — FENTANYL CITRATE 50 MCG: 50 INJECTION, SOLUTION INTRAMUSCULAR; INTRAVENOUS at 09:44

## 2024-05-01 RX ADMIN — ROCURONIUM BROMIDE 5 MG: 10 INJECTION INTRAVENOUS at 09:44

## 2024-05-01 RX ADMIN — HYDROMORPHONE HYDROCHLORIDE 0.5 MG: 2 INJECTION INTRAMUSCULAR; INTRAVENOUS; SUBCUTANEOUS at 10:08

## 2024-05-01 RX ADMIN — LIDOCAINE HYDROCHLORIDE 100 MG: 20 INJECTION, SOLUTION EPIDURAL; INFILTRATION; INTRACAUDAL; PERINEURAL at 09:44

## 2024-05-01 RX ADMIN — KETOROLAC TROMETHAMINE 15 MG: 30 INJECTION, SOLUTION INTRAMUSCULAR at 11:32

## 2024-05-01 RX ADMIN — DEXAMETHASONE SODIUM PHOSPHATE 4 MG: 4 INJECTION, SOLUTION INTRAMUSCULAR; INTRAVENOUS at 09:50

## 2024-05-01 RX ADMIN — WATER 2000 MG: 1 INJECTION INTRAMUSCULAR; INTRAVENOUS; SUBCUTANEOUS at 09:53

## 2024-05-01 RX ADMIN — PROPOFOL 200 MG: 10 INJECTION, EMULSION INTRAVENOUS at 09:44

## 2024-05-01 RX ADMIN — TAMSULOSIN HYDROCHLORIDE 0.4 MG: 0.4 CAPSULE ORAL at 12:15

## 2024-05-01 RX ADMIN — BETHANECHOL CHLORIDE 10 MG: 10 TABLET ORAL at 12:15

## 2024-05-01 RX ADMIN — HYDROMORPHONE HYDROCHLORIDE 0.4 MG: 2 INJECTION INTRAMUSCULAR; INTRAVENOUS; SUBCUTANEOUS at 11:58

## 2024-05-01 RX ADMIN — OXYCODONE 10 MG: 5 TABLET ORAL at 12:15

## 2024-05-01 RX ADMIN — SODIUM CHLORIDE, POTASSIUM CHLORIDE, SODIUM LACTATE AND CALCIUM CHLORIDE: 600; 310; 30; 20 INJECTION, SOLUTION INTRAVENOUS at 08:33

## 2024-05-01 RX ADMIN — ROCURONIUM BROMIDE 20 MG: 10 INJECTION INTRAVENOUS at 10:24

## 2024-05-01 RX ADMIN — ROCURONIUM BROMIDE 25 MG: 10 INJECTION INTRAVENOUS at 09:54

## 2024-05-01 RX ADMIN — DEXMEDETOMIDINE HYDROCHLORIDE 4 MCG: 100 INJECTION, SOLUTION, CONCENTRATE INTRAVENOUS at 10:23

## 2024-05-01 RX ADMIN — SUGAMMADEX 200 MG: 100 INJECTION, SOLUTION INTRAVENOUS at 11:34

## 2024-05-01 RX ADMIN — FENTANYL CITRATE 50 MCG: 50 INJECTION, SOLUTION INTRAMUSCULAR; INTRAVENOUS at 10:00

## 2024-05-01 ASSESSMENT — PAIN DESCRIPTION - DESCRIPTORS
DESCRIPTORS: SORE
DESCRIPTORS: ACHING

## 2024-05-01 ASSESSMENT — PAIN DESCRIPTION - ORIENTATION: ORIENTATION: ANTERIOR

## 2024-05-01 ASSESSMENT — PAIN SCALES - GENERAL
PAINLEVEL_OUTOF10: 3
PAINLEVEL_OUTOF10: 5

## 2024-05-01 ASSESSMENT — PAIN DESCRIPTION - LOCATION
LOCATION: ABDOMEN
LOCATION: ABDOMEN

## 2024-05-01 ASSESSMENT — PAIN - FUNCTIONAL ASSESSMENT: PAIN_FUNCTIONAL_ASSESSMENT: 0-10

## 2024-05-01 ASSESSMENT — PAIN DESCRIPTION - PAIN TYPE: TYPE: SURGICAL PAIN

## 2024-05-01 NOTE — DISCHARGE INSTRUCTIONS
Dr. Pinzon's Discharge Instructions after  Ventral/Insicional Hernia Repair  for:  Srinath Howe    MRN: [id number] : 1960    Admitted: 2024  Discharged: 2024         Hernia Repair: What to Expect at Home     Your Recovery    You are likely to have pain for the next few days. You may also feel like you have the flu, and you may have a low fever and feel tired and nauseated. This is common.    You should feel better after a few days and will probably feel much better in 7 days. For several weeks you may feel twinges or pulling in the hernia repair when you move. You may have some bruising and swelling. This is normal.    This care sheet gives you a general idea about how long it will take for you to recover. But each person recovers at a different pace. Follow the steps below to get better as quickly as possible.    How can you care for yourself at home?    Activity  Rest when you feel tired. Getting enough sleep will help you recover. You can sleep with your head up by using three or four pillows. You can also try to sleep with your head up in a recliner chair.  Try to walk each day. Start by walking a little more than you did the day before. Bit by bit, increase the amount you walk. Walking boosts blood flow and helps prevent pneumonia and constipation.  Put ice or a cold pack on the area of your hernia repair for 10 to 15 minutes at a time. Try to do this every 1 to 2 hours for the first 24 hours (when you are awake) or until the swelling goes down. Put a thin cloth between the ice and your skin.  Avoid strenuous activities, such as biking, jogging, weight lifting, or aerobic exercise, until your doctor says it is okay.  You are encouraged to cough and deep breath or use your incentive spirometer if you were given one, every 15-30 minutes when awake.  This will help prevent respiratory complications and low grade fevers post-operatively.  You may want to hug a pillow when coughing and sneezing

## 2024-05-01 NOTE — OP NOTE
OPERATIVE NOTE  5/1/2024    Preperative Diagnosis: Incarcerated abdominal wall hernia   Post-operative Diagnosis: Incarcerated abdominal wall hernia    Surgeon: Cody Pinzon MD FACS  Assistant: Circulator: Magaly Monzon RN  First Assistant: Liddle, Gerald, RN  Relief Scrub: Bertha Moreno RN  Scrub Person First: Max Gould    Procedures:    1. Right retrorectus release (myocutaneous flap of the trunk)  2. Left retrorectus release (myocutaneous flap of the trunk)  3. Laparoscopic incarcerated abdominal wall hernia repair with placement of mesh,  14  cm  4. Robot assisted.    Anesthesia: Gen + Local  Estimated Blood Loss: Minimal    Complications: None  Pathology:   ID Type Source Tests Collected by Time Destination   1 : abdominal wall hernia sac Tissue Hernia Sac SURGICAL PATHOLOGY Cody Pinzon MD 5/1/2024 1104      Implants:   Implant Name Type Inv. Item Serial No.  Lot No. LRB No. Used Action   MESH ELIZA [id number] RECT [employer] POLYPR - [id number]  MESH ELIZA [id number] RECT [employer]OMAT[employer] COMP POLYPR [id number]  [employer] NA N/A 1 Implanted       FINDINGS: The patient is noted to have a 5 CM defect along the midline at the umbilicus with a second 2 cm hernia just cephalad and to the right.  These were noted to contain incarcerated omentum.  Some of the omentum was amputated along with a hernia sac and sent to pathology.  There were additional 1-2 cm hernias along the upper midline.  These hernias were all associated with an 8 cm diastases.  Total craniocaudal length of the hernias is 14 cm. The fascia was able to be approximated only after recreation of the anterior abdominal wall with a myocutaneous advancement flap of the right and left rectus muscle. Remaining abdominal cavity appeared normal.     Hernia size in cm: Craniocaudal 14 x Width 5  Hernia incisional? No  Location: Location of Hernia: M3 (Midline- Umbilical)  Mesh was used for Mesh:

## 2024-05-01 NOTE — PERIOP NOTE
0809 - PT DENIES FEVER, COLD, COUGH, SOB, N/V, DIARRHEA... PRE-OP TCHING DONE - PT VERBALIZES UNDERSTANDING.  STRETCHER IN LOWEST POSITION, CB IN PLACE AND SR UP X2.

## 2024-05-01 NOTE — INTERVAL H&P NOTE
Update History & Physical    The Patient's History and Physical attached was reviewed with the patient and I examined the patient.  There is no change.  The surgical site was confirmed by the patient and me.    Plan:  The risk, benefits, expected outcome, and alternative to the recommended procedure have been discussed with Srinath Howe.  He understands and wants to proceed with the procedure.

## 2024-05-01 NOTE — ANESTHESIA POSTPROCEDURE EVALUATION
Department of Anesthesiology  Postprocedure Note    Patient: Srinath Howe  MRN: [id number]  YOB: 1960  Date of evaluation: 5/1/2024    Procedure Summary       Date: 05/01/24 Room / Location: [facility] MAIN OR  / [facility] MAIN OR    Anesthesia Start: 0940 Anesthesia Stop: 1203    Procedure: ROBOT ASSISTED LAPAROSCOPIC ANTERIOR ABDOMINAL HERNIA REPAIR WITH MESH WITH SEPERATION OF COMPONENTS (Abdomen) Diagnosis:       Abdominal wall hernia      (Abdominal wall hernia [K43.9])    Providers: Cody Pinzon MD Responsible Provider: Isaac Stratton MD    Anesthesia Type: General ASA Status: 2            Anesthesia Type: General    Javier Phase I: Javier Score: 10    Javier Phase II: Javier Score: 10    Anesthesia Post Evaluation    Patient location during evaluation: PACU  Patient participation: complete - patient participated  Level of consciousness: awake and alert  Airway patency: patent  Nausea & Vomiting: no nausea  Cardiovascular status: hemodynamically stable  Respiratory status: acceptable  Hydration status: euvolemic  Pain management: adequate    No notable events documented.

## 2024-05-16 ENCOUNTER — OFFICE VISIT (OUTPATIENT)
Age: 64
End: 2024-05-16

## 2024-05-16 VITALS
HEIGHT: 70 IN | BODY MASS INDEX: 31.24 KG/M2 | TEMPERATURE: 98 F | SYSTOLIC BLOOD PRESSURE: 118 MMHG | OXYGEN SATURATION: 94 % | HEART RATE: 64 BPM | RESPIRATION RATE: 16 BRPM | DIASTOLIC BLOOD PRESSURE: 70 MMHG | WEIGHT: 218.2 LBS

## 2024-05-16 DIAGNOSIS — Z09 POSTOPERATIVE EXAMINATION: Primary | ICD-10-CM

## 2024-05-16 PROCEDURE — 99024 POSTOP FOLLOW-UP VISIT: CPT | Performed by: SURGERY

## 2024-05-16 ASSESSMENT — PATIENT HEALTH QUESTIONNAIRE - PHQ9
SUM OF ALL RESPONSES TO PHQ QUESTIONS 1-9: 0
SUM OF ALL RESPONSES TO PHQ QUESTIONS 1-9: 0
2. FEELING DOWN, DEPRESSED OR HOPELESS: NOT AT ALL
SUM OF ALL RESPONSES TO PHQ9 QUESTIONS 1 & 2: 0
SUM OF ALL RESPONSES TO PHQ QUESTIONS 1-9: 0
SUM OF ALL RESPONSES TO PHQ QUESTIONS 1-9: 0
1. LITTLE INTEREST OR PLEASURE IN DOING THINGS: NOT AT ALL

## 2024-05-16 NOTE — PROGRESS NOTES
Identified pt with two pt identifiers (name and ). Reviewed chart in preparation for visit and have obtained necessary documentation.    Srinath Howe is a 63 y.o. male  Chief Complaint   Patient presents with    Post-Op Check     S/p 1. Right retrorectus release (myocutaneous flap of the trunk) 2. Left retrorectus release (myocutaneous flap of the trunk) 3. Laparoscopic incarcerated abdominal wall hernia repair with placement of mesh, 14 cm4. Robot assisted on 2024     /70 (Site: Left Upper Arm, Position: Sitting)   Pulse 64   Temp 98 °F (36.7 °C) (Oral)   Resp 16   Ht 1.778 m (5' 10\")   Wt 99 kg (218 lb 3.2 oz)   SpO2 94%   BMI 31.31 kg/m²     1. Have you been to the ER, urgent care clinic since your last visit?  Hospitalized since your last visit?no    2. Have you seen or consulted any other health care providers outside of the [facility] since your last visit?  Include any pap smears or colon screening. no

## 2024-05-16 NOTE — PROGRESS NOTES
Chief Complaint   Patient presents with    Post-Op Check     S/p 1. Right retrorectus release (myocutaneous flap of the trunk) 2. Left retrorectus release (myocutaneous flap of the trunk) 3. Laparoscopic incarcerated abdominal wall hernia repair with placement of mesh, 14 cm4. Robot assisted on 5/1/2024     Reviewed pathology: benign    Tolerating PO  BMs: normal    Pain controlled with pain meds      Physical Exam:   Abdominal exam: Soft, non-distended, appropriatly tender.    Wound: clean, dry, no drainage    Doing well  Continue restricted activity for a total of 4 weeks  Call with any questions or concerns        Cody Pinzon MD FACS

## 2024-07-03 PROBLEM — M17.11 PRIMARY OSTEOARTHRITIS OF RIGHT KNEE: Status: ACTIVE | Noted: 2024-07-03

## 2024-09-09 ENCOUNTER — HOSPITAL ENCOUNTER (OUTPATIENT)
Facility: HOSPITAL | Age: 64
Discharge: HOME | End: 2024-09-12
Payer: COMMERCIAL

## 2024-09-09 VITALS
RESPIRATION RATE: 18 BRPM | HEART RATE: 68 BPM | HEIGHT: 70 IN | SYSTOLIC BLOOD PRESSURE: 160 MMHG | DIASTOLIC BLOOD PRESSURE: 88 MMHG | TEMPERATURE: 98.2 F | WEIGHT: 222.6 LBS | BODY MASS INDEX: 31.87 KG/M2

## 2024-09-09 LAB
ABO + RH BLD: NORMAL
ANION GAP SERPL CALC-SCNC: 5 MMOL/L (ref 2–12)
APPEARANCE UR: CLEAR
BACTERIA URNS QL MICRO: NEGATIVE /HPF
BILIRUB UR QL: NEGATIVE
BLOOD GROUP ANTIBODIES SERPL: NORMAL
BUN SERPL-MCNC: 12 MG/DL (ref 6–20)
BUN/CREAT SERPL: 13 (ref 12–20)
CALCIUM SERPL-MCNC: 9 MG/DL (ref 8.5–10.1)
CHLORIDE SERPL-SCNC: 104 MMOL/L (ref 97–108)
CO2 SERPL-SCNC: 29 MMOL/L (ref 21–32)
COLOR UR: NORMAL
CREAT SERPL-MCNC: 0.96 MG/DL (ref 0.7–1.3)
EPITH CASTS URNS QL MICRO: NORMAL /LPF
ERYTHROCYTE [DISTWIDTH] IN BLOOD BY AUTOMATED COUNT: 13.8 % (ref 11.5–14.5)
EST. AVERAGE GLUCOSE BLD GHB EST-MCNC: 108 MG/DL
GLUCOSE SERPL-MCNC: 119 MG/DL (ref 65–100)
GLUCOSE UR STRIP.AUTO-MCNC: NEGATIVE MG/DL
HBA1C MFR BLD: 5.4 % (ref 4–5.6)
HCT VFR BLD AUTO: 43 % (ref 36.6–50.3)
HGB BLD-MCNC: 14 G/DL (ref 12.1–17)
HGB UR QL STRIP: NEGATIVE
HYALINE CASTS URNS QL MICRO: NORMAL /LPF (ref 0–5)
INR PPP: 1 (ref 0.9–1.1)
KETONES UR QL STRIP.AUTO: NEGATIVE MG/DL
LEUKOCYTE ESTERASE UR QL STRIP.AUTO: NEGATIVE
MCH RBC QN AUTO: 32 PG (ref 26–34)
MCHC RBC AUTO-ENTMCNC: 32.6 G/DL (ref 30–36.5)
MCV RBC AUTO: 98.2 FL (ref 80–99)
NITRITE UR QL STRIP.AUTO: NEGATIVE
NRBC # BLD: 0 K/UL (ref 0–0.01)
NRBC BLD-RTO: 0 PER 100 WBC
PH UR STRIP: 7.5 (ref 5–8)
PLATELET # BLD AUTO: 289 K/UL (ref 150–400)
PMV BLD AUTO: 9.4 FL (ref 8.9–12.9)
POTASSIUM SERPL-SCNC: 4.3 MMOL/L (ref 3.5–5.1)
PROT UR STRIP-MCNC: NEGATIVE MG/DL
PROTHROMBIN TIME: 10.5 SEC (ref 9–11.1)
RBC # BLD AUTO: 4.38 M/UL (ref 4.1–5.7)
RBC #/AREA URNS HPF: NORMAL /HPF (ref 0–5)
SODIUM SERPL-SCNC: 138 MMOL/L (ref 136–145)
SP GR UR REFRACTOMETRY: 1.01 (ref 1–1.03)
SPECIMEN EXP DATE BLD: NORMAL
URINE CULTURE IF INDICATED: NORMAL
UROBILINOGEN UR QL STRIP.AUTO: 0.2 EU/DL (ref 0.2–1)
WBC # BLD AUTO: 7.1 K/UL (ref 4.1–11.1)
WBC URNS QL MICRO: NORMAL /HPF (ref 0–4)

## 2024-09-09 PROCEDURE — 85610 PROTHROMBIN TIME: CPT

## 2024-09-09 PROCEDURE — 85027 COMPLETE CBC AUTOMATED: CPT

## 2024-09-09 PROCEDURE — 86901 BLOOD TYPING SEROLOGIC RH(D): CPT

## 2024-09-09 PROCEDURE — 36415 COLL VENOUS BLD VENIPUNCTURE: CPT

## 2024-09-09 PROCEDURE — 80048 BASIC METABOLIC PNL TOTAL CA: CPT

## 2024-09-09 PROCEDURE — 81001 URINALYSIS AUTO W/SCOPE: CPT

## 2024-09-09 PROCEDURE — 86850 RBC ANTIBODY SCREEN: CPT

## 2024-09-09 PROCEDURE — APPNB30 APP NON BILLABLE TIME 0-30 MINS: Performed by: NURSE PRACTITIONER

## 2024-09-09 PROCEDURE — 86900 BLOOD TYPING SEROLOGIC ABO: CPT

## 2024-09-09 PROCEDURE — 83036 HEMOGLOBIN GLYCOSYLATED A1C: CPT

## 2024-09-09 PROCEDURE — 93005 ELECTROCARDIOGRAM TRACING: CPT | Performed by: ORTHOPAEDIC SURGERY

## 2024-09-09 ASSESSMENT — KOOS JR
KOOS JR TOTAL INTERVAL SCORE: 70.704
GOING UP OR DOWN STAIRS: MILD
BENDING TO THE FLOOR TO PICK UP OBJECT: MILD
TWISING OR PIVOTING ON KNEE: MILD
STANDING UPRIGHT: MILD
RISING FROM SITTING: MILD
HOW SEVERE IS YOUR KNEE STIFFNESS AFTER FIRST WAKING IN MORNING: MILD

## 2024-09-09 ASSESSMENT — PROMIS GLOBAL HEALTH SCALE
IN GENERAL, HOW WOULD YOU RATE YOUR PHYSICAL HEALTH [ON A SCALE OF 1 (POOR) TO 5 (EXCELLENT)]?: FAIR
SUM OF RESPONSES TO QUESTIONS 2, 4, 5, & 10: 18
IN GENERAL, PLEASE RATE HOW WELL YOU CARRY OUT YOUR USUAL SOCIAL ACTIVITIES (INCLUDES ACTIVITIES AT HOME, AT WORK, AND IN YOUR COMMUNITY, AND RESPONSIBILITIES AS A PARENT, CHILD, SPOUSE, EMPLOYEE, FRIEND, ETC) [ON A SCALE OF 1 (POOR) TO 5 (EXCELLENT)]?: EXCELLENT
IN GENERAL, WOULD YOU SAY YOUR HEALTH IS...[ON A SCALE OF 1 (POOR) TO 5 (EXCELLENT)]: GOOD
TO WHAT EXTENT ARE YOU ABLE TO CARRY OUT YOUR EVERYDAY PHYSICAL ACTIVITIES SUCH AS WALKING, CLIMBING STAIRS, CARRYING GROCERIES, OR MOVING A CHAIR [ON A SCALE OF 1 (NOT AT ALL) TO 5 (COMPLETELY)]?: MOSTLY
SUM OF RESPONSES TO QUESTIONS 3, 6, 7, & 8: 12
IN THE PAST 7 DAYS, HOW OFTEN HAVE YOU BEEN BOTHERED BY EMOTIONAL PROBLEMS, SUCH AS FEELING ANXIOUS, DEPRESSED, OR IRRITABLE [ON A SCALE FROM 1 (NEVER) TO 5 (ALWAYS)]?: NEVER
IN GENERAL, HOW WOULD YOU RATE YOUR SATISFACTION WITH YOUR SOCIAL ACTIVITIES AND RELATIONSHIPS [ON A SCALE OF 1 (POOR) TO 5 (EXCELLENT)]?: EXCELLENT
IN GENERAL, WOULD YOU SAY YOUR QUALITY OF LIFE IS...[ON A SCALE OF 1 (POOR) TO 5 (EXCELLENT)]: VERY GOOD
IN GENERAL, HOW WOULD YOU RATE YOUR MENTAL HEALTH, INCLUDING YOUR MOOD AND YOUR ABILITY TO THINK [ON A SCALE OF 1 (POOR) TO 5 (EXCELLENT)]?: VERY GOOD
IN THE PAST 7 DAYS, HOW WOULD YOU RATE YOUR FATIGUE ON AVERAGE [ON A SCALE FROM 1 (NONE) TO 5 (VERY SEVERE)]?: MILD
HOW IS THE PROMIS V1.1 BEING ADMINISTERED?: PAPER
IN THE PAST 7 DAYS, HOW WOULD YOU RATE YOUR PAIN ON AVERAGE [ON A SCALE FROM 0 (NO PAIN) TO 10 (WORST IMAGINABLE PAIN)]?: 2

## 2024-09-10 LAB
BACTERIA SPEC CULT: NORMAL
BACTERIA SPEC CULT: NORMAL
EKG ATRIAL RATE: 71 BPM
EKG DIAGNOSIS: NORMAL
EKG P AXIS: 30 DEGREES
EKG P-R INTERVAL: 134 MS
EKG Q-T INTERVAL: 412 MS
EKG QRS DURATION: 102 MS
EKG QTC CALCULATION (BAZETT): 447 MS
EKG R AXIS: 29 DEGREES
EKG T AXIS: 22 DEGREES
EKG VENTRICULAR RATE: 71 BPM
SERVICE CMNT-IMP: NORMAL

## 2024-09-10 PROCEDURE — 93010 ELECTROCARDIOGRAM REPORT: CPT | Performed by: SPECIALIST

## 2024-09-11 PROBLEM — Z01.818 ENCOUNTER FOR PREADMISSION TESTING: Status: ACTIVE | Noted: 2024-09-11

## 2024-09-20 RX ORDER — HYDROXYZINE HYDROCHLORIDE 10 MG/1
10 TABLET, FILM COATED ORAL EVERY 8 HOURS PRN
Status: CANCELLED | OUTPATIENT
Start: 2024-09-20

## 2024-09-20 RX ORDER — ROSUVASTATIN CALCIUM 10 MG/1
20 TABLET, COATED ORAL
Status: CANCELLED | OUTPATIENT
Start: 2024-09-20

## 2024-09-20 RX ORDER — SODIUM CHLORIDE 0.9 % (FLUSH) 0.9 %
5-40 SYRINGE (ML) INJECTION EVERY 12 HOURS SCHEDULED
Status: CANCELLED | OUTPATIENT
Start: 2024-09-20

## 2024-09-20 RX ORDER — SODIUM CHLORIDE 9 MG/ML
INJECTION, SOLUTION INTRAVENOUS CONTINUOUS
Status: CANCELLED | OUTPATIENT
Start: 2024-09-20 | End: 2024-09-21

## 2024-09-20 RX ORDER — FAMOTIDINE 20 MG/1
20 TABLET, FILM COATED ORAL 2 TIMES DAILY PRN
Status: CANCELLED | OUTPATIENT
Start: 2024-09-20

## 2024-09-20 RX ORDER — 0.9 % SODIUM CHLORIDE 0.9 %
500 INTRAVENOUS SOLUTION INTRAVENOUS
Status: CANCELLED | OUTPATIENT
Start: 2024-09-20 | End: 2024-09-21

## 2024-09-20 RX ORDER — LISINOPRIL 20 MG/1
20 TABLET ORAL NIGHTLY
Status: CANCELLED | OUTPATIENT
Start: 2024-09-20

## 2024-09-20 RX ORDER — OXYCODONE HYDROCHLORIDE 5 MG/1
5 TABLET ORAL
Status: CANCELLED | OUTPATIENT
Start: 2024-09-20

## 2024-09-20 RX ORDER — ONDANSETRON 2 MG/ML
4 INJECTION INTRAMUSCULAR; INTRAVENOUS EVERY 6 HOURS PRN
Status: CANCELLED | OUTPATIENT
Start: 2024-09-20

## 2024-09-20 RX ORDER — HYDROMORPHONE HYDROCHLORIDE 1 MG/ML
0.5 INJECTION, SOLUTION INTRAMUSCULAR; INTRAVENOUS; SUBCUTANEOUS
Status: CANCELLED | OUTPATIENT
Start: 2024-09-20

## 2024-09-20 RX ORDER — ASPIRIN 325 MG
325 TABLET, DELAYED RELEASE (ENTERIC COATED) ORAL 2 TIMES DAILY
Status: CANCELLED | OUTPATIENT
Start: 2024-09-20

## 2024-09-20 RX ORDER — SODIUM CHLORIDE 0.9 % (FLUSH) 0.9 %
5-40 SYRINGE (ML) INJECTION PRN
Status: CANCELLED | OUTPATIENT
Start: 2024-09-20

## 2024-09-20 RX ORDER — OXYCODONE HYDROCHLORIDE 5 MG/1
10 TABLET ORAL
Status: CANCELLED | OUTPATIENT
Start: 2024-09-20

## 2024-09-20 RX ORDER — SODIUM CHLORIDE 9 MG/ML
INJECTION, SOLUTION INTRAVENOUS PRN
Status: CANCELLED | OUTPATIENT
Start: 2024-09-20

## 2024-09-20 RX ORDER — ONDANSETRON 4 MG/1
4 TABLET, ORALLY DISINTEGRATING ORAL EVERY 8 HOURS PRN
Status: CANCELLED | OUTPATIENT
Start: 2024-09-20

## 2024-09-24 ENCOUNTER — HOSPITAL ENCOUNTER (OUTPATIENT)
Facility: HOSPITAL | Age: 64
Setting detail: OBSERVATION
Discharge: HOME | End: 2024-09-24
Attending: ORTHOPAEDIC SURGERY | Admitting: ORTHOPAEDIC SURGERY
Payer: COMMERCIAL

## 2024-09-24 ENCOUNTER — ANESTHESIA (OUTPATIENT)
Facility: HOSPITAL | Age: 64
End: 2024-09-24
Payer: COMMERCIAL

## 2024-09-24 ENCOUNTER — ANESTHESIA EVENT (OUTPATIENT)
Facility: HOSPITAL | Age: 64
End: 2024-09-24
Payer: COMMERCIAL

## 2024-09-24 VITALS
WEIGHT: 220 LBS | HEIGHT: 70 IN | RESPIRATION RATE: 21 BRPM | SYSTOLIC BLOOD PRESSURE: 133 MMHG | TEMPERATURE: 97.5 F | DIASTOLIC BLOOD PRESSURE: 86 MMHG | OXYGEN SATURATION: 98 % | BODY MASS INDEX: 31.5 KG/M2 | HEART RATE: 72 BPM

## 2024-09-24 DIAGNOSIS — Z96.651 S/P TOTAL KNEE ARTHROPLASTY, RIGHT: Primary | ICD-10-CM

## 2024-09-24 PROBLEM — M17.11 ARTHRITIS OF RIGHT KNEE: Status: ACTIVE | Noted: 2024-09-24

## 2024-09-24 LAB
ABO + RH BLD: NORMAL
BLOOD GROUP ANTIBODIES SERPL: NORMAL
GLUCOSE BLD STRIP.AUTO-MCNC: 123 MG/DL (ref 65–117)
SERVICE CMNT-IMP: ABNORMAL
SPECIMEN EXP DATE BLD: NORMAL

## 2024-09-24 PROCEDURE — 7100000000 HC PACU RECOVERY - FIRST 15 MIN: Performed by: ORTHOPAEDIC SURGERY

## 2024-09-24 PROCEDURE — 2720000010 HC SURG SUPPLY STERILE: Performed by: ORTHOPAEDIC SURGERY

## 2024-09-24 PROCEDURE — 82962 GLUCOSE BLOOD TEST: CPT

## 2024-09-24 PROCEDURE — 86850 RBC ANTIBODY SCREEN: CPT

## 2024-09-24 PROCEDURE — G0378 HOSPITAL OBSERVATION PER HR: HCPCS

## 2024-09-24 PROCEDURE — 2580000003 HC RX 258: Performed by: PHYSICIAN ASSISTANT

## 2024-09-24 PROCEDURE — 3700000001 HC ADD 15 MINUTES (ANESTHESIA): Performed by: ORTHOPAEDIC SURGERY

## 2024-09-24 PROCEDURE — 6360000002 HC RX W HCPCS: Performed by: ORTHOPAEDIC SURGERY

## 2024-09-24 PROCEDURE — 6360000002 HC RX W HCPCS: Performed by: ANESTHESIOLOGY

## 2024-09-24 PROCEDURE — 97161 PT EVAL LOW COMPLEX 20 MIN: CPT

## 2024-09-24 PROCEDURE — C9290 INJ, BUPIVACAINE LIPOSOME: HCPCS | Performed by: ORTHOPAEDIC SURGERY

## 2024-09-24 PROCEDURE — 7100000010 HC PHASE II RECOVERY - FIRST 15 MIN: Performed by: ORTHOPAEDIC SURGERY

## 2024-09-24 PROCEDURE — 86901 BLOOD TYPING SEROLOGIC RH(D): CPT

## 2024-09-24 PROCEDURE — 6360000002 HC RX W HCPCS

## 2024-09-24 PROCEDURE — C1713 ANCHOR/SCREW BN/BN,TIS/BN: HCPCS | Performed by: ORTHOPAEDIC SURGERY

## 2024-09-24 PROCEDURE — 3600000015 HC SURGERY LEVEL 5 ADDTL 15MIN: Performed by: ORTHOPAEDIC SURGERY

## 2024-09-24 PROCEDURE — 64447 NJX AA&/STRD FEMORAL NRV IMG: CPT | Performed by: ANESTHESIOLOGY

## 2024-09-24 PROCEDURE — 3600000005 HC SURGERY LEVEL 5 BASE: Performed by: ORTHOPAEDIC SURGERY

## 2024-09-24 PROCEDURE — 97116 GAIT TRAINING THERAPY: CPT

## 2024-09-24 PROCEDURE — 2580000003 HC RX 258: Performed by: ANESTHESIOLOGY

## 2024-09-24 PROCEDURE — 20985 CPTR-ASST DIR MS PX: CPT | Performed by: ORTHOPAEDIC SURGERY

## 2024-09-24 PROCEDURE — C1776 JOINT DEVICE (IMPLANTABLE): HCPCS | Performed by: ORTHOPAEDIC SURGERY

## 2024-09-24 PROCEDURE — 7100000001 HC PACU RECOVERY - ADDTL 15 MIN: Performed by: ORTHOPAEDIC SURGERY

## 2024-09-24 PROCEDURE — 6360000002 HC RX W HCPCS: Performed by: PHYSICIAN ASSISTANT

## 2024-09-24 PROCEDURE — 2709999900 HC NON-CHARGEABLE SUPPLY: Performed by: ORTHOPAEDIC SURGERY

## 2024-09-24 PROCEDURE — 7100000011 HC PHASE II RECOVERY - ADDTL 15 MIN: Performed by: ORTHOPAEDIC SURGERY

## 2024-09-24 PROCEDURE — 36415 COLL VENOUS BLD VENIPUNCTURE: CPT

## 2024-09-24 PROCEDURE — 6370000000 HC RX 637 (ALT 250 FOR IP): Performed by: ANESTHESIOLOGY

## 2024-09-24 PROCEDURE — 3700000000 HC ANESTHESIA ATTENDED CARE: Performed by: ORTHOPAEDIC SURGERY

## 2024-09-24 PROCEDURE — 86900 BLOOD TYPING SEROLOGIC ABO: CPT

## 2024-09-24 PROCEDURE — 2580000003 HC RX 258: Performed by: ORTHOPAEDIC SURGERY

## 2024-09-24 PROCEDURE — 6370000000 HC RX 637 (ALT 250 FOR IP): Performed by: PHYSICIAN ASSISTANT

## 2024-09-24 PROCEDURE — 27447 TOTAL KNEE ARTHROPLASTY: CPT | Performed by: ORTHOPAEDIC SURGERY

## 2024-09-24 PROCEDURE — 2500000003 HC RX 250 WO HCPCS

## 2024-09-24 PROCEDURE — 97530 THERAPEUTIC ACTIVITIES: CPT

## 2024-09-24 PROCEDURE — 2580000003 HC RX 258

## 2024-09-24 DEVICE — IMPLANTABLE DEVICE
Type: IMPLANTABLE DEVICE | Site: KNEE | Status: FUNCTIONAL
Brand: PERSONA® VIVACIT-E®

## 2024-09-24 DEVICE — IMPLANTABLE DEVICE
Type: IMPLANTABLE DEVICE | Site: KNEE | Status: FUNCTIONAL
Brand: PERSONA® TRABECULAR METAL®

## 2024-09-24 DEVICE — COMPONENT PAT DIA38MM THK9.5MM KNEE POLY CEM CONVENTIONAL: Type: IMPLANTABLE DEVICE | Site: KNEE | Status: FUNCTIONAL

## 2024-09-24 DEVICE — SMARTSET GHV GENTAMICIN HIGH VISCOSITY BONE CEMENT 40G
Type: IMPLANTABLE DEVICE | Site: KNEE | Status: FUNCTIONAL
Brand: SMARTSET

## 2024-09-24 DEVICE — IMPLANTABLE DEVICE
Type: IMPLANTABLE DEVICE | Site: KNEE | Status: FUNCTIONAL
Brand: PERSONA® THE PERSONALIZED KNEE® OSSEOTI®

## 2024-09-24 DEVICE — KIT KNEE IMPL CAPPED K2 HEMI ADV CMTLS K2ZIMMERBIOMET: Type: IMPLANTABLE DEVICE | Site: KNEE | Status: FUNCTIONAL

## 2024-09-24 RX ORDER — SODIUM CHLORIDE 0.9 % (FLUSH) 0.9 %
5-40 SYRINGE (ML) INJECTION EVERY 12 HOURS SCHEDULED
Status: DISCONTINUED | OUTPATIENT
Start: 2024-09-24 | End: 2024-09-24 | Stop reason: HOSPADM

## 2024-09-24 RX ORDER — DIPHENHYDRAMINE HYDROCHLORIDE 50 MG/ML
12.5 INJECTION INTRAMUSCULAR; INTRAVENOUS
Status: DISCONTINUED | OUTPATIENT
Start: 2024-09-24 | End: 2024-09-24 | Stop reason: HOSPADM

## 2024-09-24 RX ORDER — SODIUM CHLORIDE 9 MG/ML
INJECTION, SOLUTION INTRAVENOUS PRN
Status: DISCONTINUED | OUTPATIENT
Start: 2024-09-24 | End: 2024-09-24 | Stop reason: HOSPADM

## 2024-09-24 RX ORDER — SODIUM CHLORIDE, SODIUM LACTATE, POTASSIUM CHLORIDE, CALCIUM CHLORIDE 600; 310; 30; 20 MG/100ML; MG/100ML; MG/100ML; MG/100ML
INJECTION, SOLUTION INTRAVENOUS CONTINUOUS
Status: DISCONTINUED | OUTPATIENT
Start: 2024-09-24 | End: 2024-09-24 | Stop reason: HOSPADM

## 2024-09-24 RX ORDER — PROCHLORPERAZINE EDISYLATE 5 MG/ML
5 INJECTION INTRAMUSCULAR; INTRAVENOUS
Status: DISCONTINUED | OUTPATIENT
Start: 2024-09-24 | End: 2024-09-24 | Stop reason: HOSPADM

## 2024-09-24 RX ORDER — FENTANYL CITRATE 50 UG/ML
25 INJECTION, SOLUTION INTRAMUSCULAR; INTRAVENOUS EVERY 5 MIN PRN
Status: DISCONTINUED | OUTPATIENT
Start: 2024-09-24 | End: 2024-09-24 | Stop reason: HOSPADM

## 2024-09-24 RX ORDER — ROPIVACAINE HYDROCHLORIDE 5 MG/ML
INJECTION, SOLUTION EPIDURAL; INFILTRATION; PERINEURAL
Status: COMPLETED | OUTPATIENT
Start: 2024-09-24 | End: 2024-09-24

## 2024-09-24 RX ORDER — WATER 10 ML/10ML
INJECTION INTRAMUSCULAR; INTRAVENOUS; SUBCUTANEOUS
Status: DISCONTINUED
Start: 2024-09-24 | End: 2024-09-24 | Stop reason: HOSPADM

## 2024-09-24 RX ORDER — OXYCODONE HYDROCHLORIDE 5 MG/1
5-10 TABLET ORAL EVERY 4 HOURS PRN
Qty: 42 TABLET | Refills: 0 | Status: SHIPPED | OUTPATIENT
Start: 2024-09-24 | End: 2024-10-01

## 2024-09-24 RX ORDER — FENTANYL CITRATE 50 UG/ML
50 INJECTION, SOLUTION INTRAMUSCULAR; INTRAVENOUS PRN
Status: DISCONTINUED | OUTPATIENT
Start: 2024-09-24 | End: 2024-09-24 | Stop reason: HOSPADM

## 2024-09-24 RX ORDER — SENNA AND DOCUSATE SODIUM 50; 8.6 MG/1; MG/1
1 TABLET, FILM COATED ORAL DAILY
Qty: 30 TABLET | Refills: 1 | Status: SHIPPED | OUTPATIENT
Start: 2024-09-24

## 2024-09-24 RX ORDER — PROPOFOL 10 MG/ML
INJECTION, EMULSION INTRAVENOUS
Status: DISCONTINUED | OUTPATIENT
Start: 2024-09-24 | End: 2024-09-24 | Stop reason: SDUPTHER

## 2024-09-24 RX ORDER — OXYCODONE HYDROCHLORIDE 5 MG/1
5 TABLET ORAL
Status: COMPLETED | OUTPATIENT
Start: 2024-09-24 | End: 2024-09-24

## 2024-09-24 RX ORDER — ACETAMINOPHEN 500 MG
1000 TABLET ORAL EVERY 6 HOURS
Status: DISCONTINUED | OUTPATIENT
Start: 2024-09-24 | End: 2024-09-24 | Stop reason: HOSPADM

## 2024-09-24 RX ORDER — ONDANSETRON 2 MG/ML
4 INJECTION INTRAMUSCULAR; INTRAVENOUS
Status: COMPLETED | OUTPATIENT
Start: 2024-09-24 | End: 2024-09-24

## 2024-09-24 RX ORDER — MIDAZOLAM HYDROCHLORIDE 2 MG/2ML
2 INJECTION, SOLUTION INTRAMUSCULAR; INTRAVENOUS PRN
Status: DISCONTINUED | OUTPATIENT
Start: 2024-09-24 | End: 2024-09-24 | Stop reason: HOSPADM

## 2024-09-24 RX ORDER — SODIUM CHLORIDE 0.9 % (FLUSH) 0.9 %
5-40 SYRINGE (ML) INJECTION PRN
Status: DISCONTINUED | OUTPATIENT
Start: 2024-09-24 | End: 2024-09-24 | Stop reason: HOSPADM

## 2024-09-24 RX ORDER — NALOXONE HYDROCHLORIDE 0.4 MG/ML
INJECTION, SOLUTION INTRAMUSCULAR; INTRAVENOUS; SUBCUTANEOUS PRN
Status: DISCONTINUED | OUTPATIENT
Start: 2024-09-24 | End: 2024-09-24 | Stop reason: HOSPADM

## 2024-09-24 RX ORDER — TRANEXAMIC ACID 100 MG/ML
INJECTION, SOLUTION INTRAVENOUS
Status: DISCONTINUED | OUTPATIENT
Start: 2024-09-24 | End: 2024-09-24 | Stop reason: SDUPTHER

## 2024-09-24 RX ORDER — MEPERIDINE HYDROCHLORIDE 25 MG/ML
12.5 INJECTION INTRAMUSCULAR; INTRAVENOUS; SUBCUTANEOUS EVERY 5 MIN PRN
Status: DISCONTINUED | OUTPATIENT
Start: 2024-09-24 | End: 2024-09-24 | Stop reason: HOSPADM

## 2024-09-24 RX ORDER — LIDOCAINE HYDROCHLORIDE 20 MG/ML
INJECTION, SOLUTION EPIDURAL; INFILTRATION; INTRACAUDAL; PERINEURAL
Status: DISCONTINUED | OUTPATIENT
Start: 2024-09-24 | End: 2024-09-24 | Stop reason: SDUPTHER

## 2024-09-24 RX ORDER — LABETALOL HYDROCHLORIDE 5 MG/ML
10 INJECTION, SOLUTION INTRAVENOUS
Status: DISCONTINUED | OUTPATIENT
Start: 2024-09-24 | End: 2024-09-24 | Stop reason: HOSPADM

## 2024-09-24 RX ORDER — ASPIRIN 325 MG
325 TABLET ORAL 2 TIMES DAILY
Qty: 60 TABLET | Refills: 0 | Status: SHIPPED | OUTPATIENT
Start: 2024-09-24 | End: 2024-09-24 | Stop reason: HOSPADM

## 2024-09-24 RX ORDER — MIDAZOLAM HYDROCHLORIDE 2 MG/2ML
2 INJECTION, SOLUTION INTRAMUSCULAR; INTRAVENOUS
Status: DISCONTINUED | OUTPATIENT
Start: 2024-09-24 | End: 2024-09-24 | Stop reason: HOSPADM

## 2024-09-24 RX ORDER — SENNA AND DOCUSATE SODIUM 50; 8.6 MG/1; MG/1
1 TABLET, FILM COATED ORAL DAILY
Qty: 30 TABLET | Refills: 1 | Status: SHIPPED | OUTPATIENT
Start: 2024-09-24 | End: 2024-09-24

## 2024-09-24 RX ORDER — CEFAZOLIN SODIUM 1 G/3ML
INJECTION, POWDER, FOR SOLUTION INTRAMUSCULAR; INTRAVENOUS
Status: DISCONTINUED
Start: 2024-09-24 | End: 2024-09-24 | Stop reason: HOSPADM

## 2024-09-24 RX ORDER — OXYCODONE HYDROCHLORIDE 5 MG/1
5-10 TABLET ORAL EVERY 4 HOURS PRN
Qty: 42 TABLET | Refills: 0 | Status: SHIPPED | OUTPATIENT
Start: 2024-09-24 | End: 2024-09-24

## 2024-09-24 RX ORDER — SODIUM CHLORIDE 9 MG/ML
INJECTION, SOLUTION INTRAVENOUS CONTINUOUS
Status: DISCONTINUED | OUTPATIENT
Start: 2024-09-24 | End: 2024-09-24 | Stop reason: HOSPADM

## 2024-09-24 RX ORDER — ROPIVACAINE HYDROCHLORIDE 5 MG/ML
30 INJECTION, SOLUTION EPIDURAL; INFILTRATION; PERINEURAL ONCE
Status: DISCONTINUED | OUTPATIENT
Start: 2024-09-24 | End: 2024-09-24 | Stop reason: HOSPADM

## 2024-09-24 RX ORDER — ONDANSETRON 2 MG/ML
INJECTION INTRAMUSCULAR; INTRAVENOUS
Status: DISCONTINUED | OUTPATIENT
Start: 2024-09-24 | End: 2024-09-24 | Stop reason: SDUPTHER

## 2024-09-24 RX ORDER — LIDOCAINE HYDROCHLORIDE 10 MG/ML
1 INJECTION, SOLUTION EPIDURAL; INFILTRATION; INTRACAUDAL; PERINEURAL
Status: DISCONTINUED | OUTPATIENT
Start: 2024-09-24 | End: 2024-09-24 | Stop reason: HOSPADM

## 2024-09-24 RX ORDER — ACETAMINOPHEN 325 MG/1
650 TABLET ORAL ONCE
Status: DISCONTINUED | OUTPATIENT
Start: 2024-09-24 | End: 2024-09-24 | Stop reason: HOSPADM

## 2024-09-24 RX ORDER — FENTANYL CITRATE 50 UG/ML
25 INJECTION, SOLUTION INTRAMUSCULAR; INTRAVENOUS PRN
Status: DISCONTINUED | OUTPATIENT
Start: 2024-09-24 | End: 2024-09-24 | Stop reason: HOSPADM

## 2024-09-24 RX ADMIN — ACETAMINOPHEN 1000 MG: 500 TABLET ORAL at 12:59

## 2024-09-24 RX ADMIN — PROPOFOL 50 MCG/KG/MIN: 10 INJECTION, EMULSION INTRAVENOUS at 07:32

## 2024-09-24 RX ADMIN — SODIUM CHLORIDE, POTASSIUM CHLORIDE, SODIUM LACTATE AND CALCIUM CHLORIDE: 600; 310; 30; 20 INJECTION, SOLUTION INTRAVENOUS at 06:45

## 2024-09-24 RX ADMIN — LIDOCAINE HYDROCHLORIDE 60 MG: 20 INJECTION, SOLUTION EPIDURAL; INFILTRATION; INTRACAUDAL; PERINEURAL at 07:31

## 2024-09-24 RX ADMIN — PHENYLEPHRINE HYDROCHLORIDE 30 MCG/MIN: 10 INJECTION INTRAVENOUS at 07:39

## 2024-09-24 RX ADMIN — ONDANSETRON 4 MG: 2 INJECTION INTRAMUSCULAR; INTRAVENOUS at 14:11

## 2024-09-24 RX ADMIN — FENTANYL CITRATE 25 MCG: 50 INJECTION INTRAMUSCULAR; INTRAVENOUS at 11:13

## 2024-09-24 RX ADMIN — HYDROMORPHONE HYDROCHLORIDE 0.5 MG: 1 INJECTION, SOLUTION INTRAMUSCULAR; INTRAVENOUS; SUBCUTANEOUS at 10:52

## 2024-09-24 RX ADMIN — ROPIVACAINE HYDROCHLORIDE 20 ML: 5 INJECTION, SOLUTION EPIDURAL; INFILTRATION; PERINEURAL at 07:23

## 2024-09-24 RX ADMIN — WATER 2000 MG: 1 INJECTION INTRAMUSCULAR; INTRAVENOUS; SUBCUTANEOUS at 07:56

## 2024-09-24 RX ADMIN — WATER 2000 MG: 1 INJECTION INTRAMUSCULAR; INTRAVENOUS; SUBCUTANEOUS at 13:54

## 2024-09-24 RX ADMIN — MIDAZOLAM 2 MG: 1 INJECTION INTRAMUSCULAR; INTRAVENOUS at 07:18

## 2024-09-24 RX ADMIN — FENTANYL CITRATE 50 MCG: 50 INJECTION INTRAMUSCULAR; INTRAVENOUS at 07:18

## 2024-09-24 RX ADMIN — TRANEXAMIC ACID 1000 MG: 100 INJECTION, SOLUTION INTRAVENOUS at 07:58

## 2024-09-24 RX ADMIN — PROPOFOL 50 MG: 10 INJECTION, EMULSION INTRAVENOUS at 07:31

## 2024-09-24 RX ADMIN — ONDANSETRON 4 MG: 2 INJECTION INTRAMUSCULAR; INTRAVENOUS at 07:28

## 2024-09-24 RX ADMIN — MEPIVACAINE HYDROCHLORIDE 52.5 MG: 15 INJECTION, SOLUTION EPIDURAL; INFILTRATION at 07:35

## 2024-09-24 RX ADMIN — OXYCODONE HYDROCHLORIDE 5 MG: 5 TABLET ORAL at 13:00

## 2024-09-24 ASSESSMENT — PAIN - FUNCTIONAL ASSESSMENT: PAIN_FUNCTIONAL_ASSESSMENT: NONE - DENIES PAIN

## 2024-09-24 ASSESSMENT — PAIN SCALES - GENERAL: PAINLEVEL_OUTOF10: 0

## (undated) DEVICE — CONTAINER,SPECIMEN,3OZ,OR STRL: Brand: MEDLINE

## (undated) DEVICE — SEAL

## (undated) DEVICE — Device: Brand: JELCO

## (undated) DEVICE — SOLUTION ANTIFOG VIS SYS CLEARIFY LAPSCP

## (undated) DEVICE — HYPODERMIC SAFETY NEEDLE: Brand: MAGELLAN

## (undated) DEVICE — 4-PORT MANIFOLD: Brand: NEPTUNE 2

## (undated) DEVICE — GOWN,SIRUS,NONRNF,SETINSLV,XL,20/CS: Brand: MEDLINE

## (undated) DEVICE — DRSG POSTOP PRMSL AG 3.5X14IN

## (undated) DEVICE — LIQUIBAND RAPID ADHESIVE 36/CS 0.8ML: Brand: MEDLINE

## (undated) DEVICE — GLOVE SURG SZ 7.5 L11.2IN THK9.8MIL STRW LTX POLYMER BEAD

## (undated) DEVICE — SYRINGE 20ML LL S/C 50

## (undated) DEVICE — SUTURE 1 STRATAFIX SYMMETRIC PDS + 30CM CT-1 SXPP1A435

## (undated) DEVICE — SOLUTION IRRIG 3000ML 0.9% SOD CHL USP UROMATIC PLAS CONT

## (undated) DEVICE — BANDAGE COMPR M W6INXL10YD WHT BGE VELC E MTRX HK AND LOOP

## (undated) DEVICE — HANDPIECE SET WITH BONE CLEANING TIP AND SUCTION TUBE: Brand: INTERPULSE

## (undated) DEVICE — KIT,1200CC CANISTER,3/16"X6' TUBING: Brand: MEDLINE INDUSTRIES, INC.

## (undated) DEVICE — TIP COVER ACCESSORY

## (undated) DEVICE — BLADE SAW W073XL276IN THK0031IN CUT THK0036IN REPL SAG

## (undated) DEVICE — ARM DRAPE

## (undated) DEVICE — Device

## (undated) DEVICE — STRYKER PERFORMANCE SERIES SAGITTAL BLADE: Brand: STRYKER PERFORMANCE SERIES

## (undated) DEVICE — SUTURE VICRYL ABSORBABLE BRAIDED 2-0 CT 36 IN DA UD  VCP957H

## (undated) DEVICE — SUTURE VICRYL + SZ 0 L36IN ABSRB VLT L40MM CT 1/2 CIR TAPR VCP358H

## (undated) DEVICE — GLOVE SURG SZ 8 L12IN FNGR THK94MIL STD WHT LTX FREE

## (undated) DEVICE — 3M™ IOBAN™ 2 ANTIMICROBIAL INCISE DRAPE 6651EZ: Brand: IOBAN™ 2

## (undated) DEVICE — SUTURE PDS II SZ 2-0 L27IN ABSRB VLT SH L26MM 1/2 CIR Z317H

## (undated) DEVICE — SPONGE GZ W4XL4IN COT 12 PLY TYP VII WVN C FLD DSGN STERILE

## (undated) DEVICE — DRAPE,EXTREMITY,89X128,STERILE: Brand: MEDLINE

## (undated) DEVICE — TOTAL JOINT - SMH: Brand: MEDLINE INDUSTRIES, INC.

## (undated) DEVICE — BINDER ABD M/L H12IN FOR 46-62IN WHT 4 SLD PNL DSGN HOOP

## (undated) DEVICE — 450 ML BOTTLE OF 0.05% CHLORHEXIDINE GLUCONATE IN 99.95% STERILE WATER FOR IRRIGATION, USP AND APPLICATOR.: Brand: IRRISEPT ANTIMICROBIAL WOUND LAVAGE

## (undated) DEVICE — INSTRUMENT SCREW BNE L25MM DIA2.5MM KNEE FULL THRD HEX FEM PERSONA

## (undated) DEVICE — SUTURE MONOCRYL SZ 4-0 L27IN ABSRB UD L19MM PS-2 1/2 CIR PRIM Y426H

## (undated) DEVICE — SUTURE STRATAFIX SYMMETRIC SZ 1 L18IN ABSRB VLT CT1 L36CM SXPP1A404

## (undated) DEVICE — GLOVE SURG SZ 65 L12IN FNGR THK94MIL STD WHT LTX FREE

## (undated) DEVICE — SOLUTION SURG PREP 26 CC PURPREP

## (undated) DEVICE — SCRUBIN SCRUB BRUSH DRY STER: Brand: MEDLINE INDUSTRIES, INC.

## (undated) DEVICE — TAPE,CLOTH/SILK,CURAD,3"X10YD,LF,40/CS: Brand: CURAD

## (undated) DEVICE — GENERAL LAPAROSCOPY-MRMC: Brand: MEDLINE INDUSTRIES, INC.

## (undated) DEVICE — HOOD, PEEL-AWAY: Brand: FLYTE

## (undated) DEVICE — PADDING CAST W6INXL4YD ST COT RAYON MICROPLEATED HIGHLY

## (undated) DEVICE — SUTURE VICRYL COATED ABSORBABLE BRAIDED 2-0 TP1 54 IN COAT UD VICRYL + VCP880T

## (undated) DEVICE — ZIMMER® STERILE DISPOSABLE TOURNIQUET CUFF WITH PLC, DUAL PORT, SINGLE BLADDER, 34 IN. (86 CM)

## (undated) DEVICE — DRESSING AQUACEL ADANTAGE SIL RBBN W2XL45CM STRENGTHENING FBR FOR PK SMALLER

## (undated) DEVICE — GLOVE ORTHO 8   MSG9480

## (undated) DEVICE — BLADELESS OBTURATOR: Brand: WECK VISTA

## (undated) DEVICE — PADDING CAST W6INXL4YD NONSTERILE COT RAYON MICROPLEATED

## (undated) DEVICE — SUTURE STRATAFIX SYMMETRIC PDS + SZ 1 L18IN ABSRB VLT L48MM SXPP1A400

## (undated) DEVICE — SYRINGE MED 10ML LUERLOCK TIP W/O SFTY DISP

## (undated) DEVICE — GLOVE SURG SZ 65 L12IN FNGR THK79MIL GRN LTX FREE